# Patient Record
Sex: FEMALE | Race: ASIAN | NOT HISPANIC OR LATINO | Employment: FULL TIME | URBAN - METROPOLITAN AREA
[De-identification: names, ages, dates, MRNs, and addresses within clinical notes are randomized per-mention and may not be internally consistent; named-entity substitution may affect disease eponyms.]

---

## 2021-11-12 NOTE — TELEPHONE ENCOUNTER
RECORDS RECEIVED FROM: Self / CE    Appt Date: 11.17.2021   NOTES STATUS DETAILS   OFFICE NOTE from referring provider N/A    OFFICE NOTES from other specialists [facility] Everywhere 12.07.2020 Jenae Dewitt M.D.    DISCHARGE SUMMARY from hospital N/A    MEDICATION LIST Care Everywhere    LIVER BIOSPY (IF APPLICABLE)      PATHOLOGY REPORTS  N/A    IMAGING     ENDOSCOPY (IF AVAILABLE) N/A    COLONOSCOPY (IF AVAILABLE) N/A    ULTRASOUND LIVER N/A    CT OF ABDOMEN Care Everywhere - in process  02.11.2020, 02.15.2020 ABDOMEN AND PELVIS WITH CONTRAST      MRI OF LIVER Care Everywhere - in process 01.30.2020 MRI MRCP W/O & W CONTRAST     FIBROSCAN, US ELASTOGRAPHY, FIBROSIS SCAN, MR ELASTOGRAPHY N/A    LABS     HEPATIC PANEL (LIVER PANEL) Care Everywhere 10.27.2021   BASIC METABOLIC PANEL Care Everywhere 12.07.2020   COMPLETE METABOLIC PANEL Care Everywhere 10.27.2021   COMPLETE BLOOD COUNT (CBC) Care Everywhere 10.27.2021   INTERNATIONAL NORMALIZED RATIO (INR) N/A    HEPATITIS C ANTIBODY N/A    HEPATITIS C VIRAL LOAD/PCR N/A    HEPATITIS C GENOTYPE N/A    HEPATITIS B SURFACE ANTIGEN N/A    HEPATITIS B SURFACE ANTIBODY N/A    HEPATITIS B DNA QUANT LEVEL N/A    HEPATITIS B CORE ANTIBODY N/A      Action 11.12.2021 RM 2:42PM   Action Taken Called [facility] at [phone] was told to fax request to [phone], pending     Action 11.17.2021 RM 6:51AM   Action Taken Images received from [facility] took to 4n to get them uploaded to the chart.     Action 11.17.2021 rm 12:08pm   Action Taken Images received and uploaded to chart

## 2021-11-17 ENCOUNTER — PRE VISIT (OUTPATIENT)
Dept: GASTROENTEROLOGY | Facility: CLINIC | Age: 31
End: 2021-11-17
Payer: COMMERCIAL

## 2021-11-17 ENCOUNTER — VIRTUAL VISIT (OUTPATIENT)
Dept: GASTROENTEROLOGY | Facility: CLINIC | Age: 31
End: 2021-11-17
Attending: INTERNAL MEDICINE
Payer: COMMERCIAL

## 2021-11-17 DIAGNOSIS — K83.9 BILIARY TRACT DISEASE: Primary | ICD-10-CM

## 2021-11-17 PROCEDURE — 99205 OFFICE O/P NEW HI 60 MIN: CPT | Mod: 95 | Performed by: INTERNAL MEDICINE

## 2021-11-17 RX ORDER — URSODIOL 250 MG/1
1 TABLET, FILM COATED ORAL
COMMUNITY
Start: 2021-08-27 | End: 2022-08-29

## 2021-11-17 RX ORDER — PROGESTERONE 200 MG/1
200 CAPSULE ORAL 2 TIMES DAILY
COMMUNITY
End: 2022-04-25

## 2021-11-17 RX ORDER — PRENATAL VIT/IRON FUM/FOLIC AC 27MG-0.8MG
1 TABLET ORAL DAILY
COMMUNITY

## 2021-11-17 ASSESSMENT — PAIN SCALES - GENERAL: PAINLEVEL: NO PAIN (0)

## 2021-11-17 NOTE — PROGRESS NOTES
Reyna is a 31 year old who is being evaluated via a billable video visit.      How would you like to obtain your AVS? MyChart  If the video visit is dropped, the invitation should be resent by: Send to e-mail at: [email or web address]  Will anyone else be joining your video visit? No      Video Start Time: 0807  Video-Visit Details    Type of service:  Video Visit    Video End Time:0837    Originating Location (pt. Location): Home    Distant Location (provider location):  [facility] [place]     Platform used for Video Visit: [employer]   _____________________________________________________________________________    [facility]    Hepatology New Patient Visit    Referring provider:  Referred Self      31 year old female    Chief complaint:  Hepatico-choledocholithiasis     HPI:  Hx choledochal cyst  - resection with hepaticojejunostomy ([place]) 2008  - laparoscopic cyst resection ([facility]) 2013  - recurrent cholangitis due to hepatolithiasis Feb 2020  - ERCP 2/11/2020  - PTC 2/13/2020  - liver bx 2/28/2020  - IR choledochoplasty 2/28/2020  - IR choledochoplasty 3/6/2020  - IR choledochoplasty 4/24/2020  - ERCP with [facility] 5/7/2020  - ERCP with [facility] 5/27/2020    The patient presents to establish care for recurrent hepatolithiasis and pregnancy.  The patient was diagnosed with a choledochal cyst in 2008 following 1 week of abdominal pain (at summer [place]) followed by nausea and vomiting.  She underwent a cyst resection with Courtney-en-Y hepaticojejunostomy in 2008.  In 2013, following an additional imaging for abnormal liver function tests, the patient underwent resection of a second cyst.      In 02/2020, the patient developed issues with recurrent cholangitis secondary to hepatolithiasis further complicated with left portal vein thrombosis.  She underwent IR-guided choledochoplasty throughout 02/2020, 03/2020 and 04/2020 in addition to undergoing endoscopic hydraulic  lithotripsy through a cholangioscope advanced through a percutaneous biliary drain tract in 05/2020.  The patient last had an MRCP/MRI in 12/2020 which showed no residual stones.    The patient is well today.  She has some mild nausea related to pregnancy.  The patient is 10 weeks pregnant and so far, things have been going well.  Her due date is 6/14/2022 based on ultrasound dates.    The patient denies abdominal pain, jaundice, abdominal distention, lower extremity edema, lethargy or confusion.    No history of melena, hematemesis or hematochezia.    The patient denies fevers, sweats or chills.  She is vaccinated against COVID-19.    Weight is stable.  Appetite is good.    The patient is not currently drinking alcohol.  Prior to getting pregnant, she would drink alcohol once to twice per week and drink 1-2 glasses of wine on these occasions.  She has no history of alcohol abuse or DUIs.    The patient has never smoked.  She denies any history of recreational drug use including marijuana, IM or IV drugs.      Medical hx Surgical hx   Past Medical History:   Diagnosis Date     Ascending cholangitis 02/10/2020     Choledochal cyst 2008     Choledochal cyst 2013     Portal vein thrombosis 02/15/2020      Past Surgical History:   Procedure Laterality Date     CHOLEDOCHAL CYST EXCISION  2008    [place]     CHOLEDOCHAL CYST EXCISION  2013    [facility]     ENDOSCOPIC RETROGRADE CHOLANGIOPANCREATOGRAPHY  05/27/2020    [facility]     ENDOSCOPIC RETROGRADE CHOLANGIOPANCREATOGRAPHY  05/07/2020    [facility]     HEPATICOJEJUNOSTOMY  2008     PERCUTANEOUS LIVER BIOPSY  02/28/2020     PERCUTANEOUS TRANSHEPATIC CHOLANGIOGRAHPY AND BILLIARY DRAINAGE  02/13/2020          Medications  Current Outpatient Medications   Medication Sig Dispense Refill     enoxaparin ANTICOAGULANT (LOVENOX) 40 MG/0.4ML syringe Inject Subcutaneous daily       Prenatal Vit-Fe Fumarate-FA (PRENATAL MULTIVITAMIN W/IRON) 27-0.8 MG tablet Take 1 tablet by mouth daily        progesterone (PROMETRIUM) 200 MG capsule Take 200 mg by mouth 2 times daily       ursodiol (ACTIGALL) 250 MG tablet Take 1 tablet by mouth 3 times daily         Allergies  No Known Allergies    Family hx Social hx   Family History   Problem Relation Age of Onset     Hepatitis Father         B     Gestational Diabetes Sister      Diabetes Maternal Grandmother      Cerebrovascular Disease Maternal Grandfather      Colon Cancer No family hx of       Social History     Tobacco Use     Smoking status: Never Smoker     Smokeless tobacco: Never Used   Substance Use Topics     Alcohol use: Not Currently     Drug use: Not Currently     Lives in [facility] [place] [facility] with .  Moved from [place].  Grew up in [place].  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  1 sister who is healthy.     Review of systems  A 10-point review of systems was negative.    Examination  Gen- well, NAD, A+Ox3, normal color  Eye- EOMI, no scleral icterus  Skin- no rash or jaundice  Psych- normal mood    Laboratory  10/27/2021  WCC 3.9, hgb 11.6, plt 295    TB 0.9, ALT 19, AST 24, AlkPh 49    HIV Ab neg  HCV Ab neg  HBV SAg neg    Liver bx 2020- extrahepatic biliary obstruction, normal liver parenchyma, no hepatic fibrosis    Resection specimen 2013- choledochal cyst    Radiology  MRI liver/MRCP Dec 2020 reviewed- no stones    Assessment  31-year-old  female who presents to establish care for a history of choledochal cysts and hepatolithiasis.  Liver function tests normal on low-dose ursodiol.  Will monitor liver function tests monthly throughout the patient's pregnancy, as hepatoliths may precipitate in the setting of current pregnancy-related hormonal changes.  Would not pursue routine MRI imaging while pregnant unless the patient develops abnormal liver function tests.      While the patient will need long-term surveillance with blood work, it is not clear if routine surveillance with MRI will be needed.   Furthermore, will eventually need to decide if patient needs to continue on long-term anticoagulation for portal vein thrombosis.      Plan  1.  Check LFTs monthly during pregnancy  2.  Continue ursodiol  3.  Follow-up in 6 months (in person)    Nehemiah Parks MD  Hepatology  [facility]    I spent 60 minutes on the date of the encounter doing chart review, history and exam, documentation and further activities as noted above.

## 2021-11-17 NOTE — LETTER
11/17/2021         RE: Reyna Liriano  [address]  [place] [address]        Dear Colleague,    Thank you for referring your patient, Reyna Liriano, to the [facility]. Please see a copy of my visit note below.    Reyna is a 31 year old who is being evaluated via a billable video visit.      How would you like to obtain your AVS? MyChart  If the video visit is dropped, the invitation should be resent by: Send to e-mail at: [email or web address]  Will anyone else be joining your video visit? No      Video Start Time: 0807  Video-Visit Details    Type of service:  Video Visit    Video End Time:0837    Originating Location (pt. Location): Home    Distant Location (provider location):  [facility]     Platform used for Video Visit: [employer]   _____________________________________________________________________________    [facility]    Hepatology New Patient Visit    Referring provider:  Referred Self      31 year old female    Chief complaint:  Hepatico-choledocholithiasis     HPI:  Hx choledochal cyst  - resection with hepaticojejunostomy ([place]) 2008  - laparoscopic cyst resection ([facility]) 2013  - recurrent cholangitis due to hepatolithiasis Feb 2020  - ERCP 2/11/2020  - PTC 2/13/2020  - liver bx 2/28/2020  - IR choledochoplasty 2/28/2020  - IR choledochoplasty 3/6/2020  - IR choledochoplasty 4/24/2020  - ERCP with [facility] 5/7/2020  - ERCP with [facility] 5/27/2020    The patient presents to establish care for recurrent hepatolithiasis and pregnancy.  The patient was diagnosed with a choledochal cyst in 2008 following 1 week of abdominal pain ([place] followed by nausea and vomiting.  She underwent a cyst resection with Courtney-en-Y hepaticojejunostomy in 2008.  In 2013, following an additional imaging for abnormal liver function tests, the patient underwent resection of a second cyst.      In 02/2020,  the patient developed issues with recurrent cholangitis secondary to hepatolithiasis further complicated with left portal vein thrombosis.  She underwent IR-guided choledochoplasty throughout 02/2020, 03/2020 and 04/2020 in addition to undergoing endoscopic hydraulic lithotripsy through a cholangioscope advanced through a percutaneous biliary drain tract in 05/2020.  The patient last had an MRCP/MRI in 12/2020 which showed no residual stones.    The patient is well today.  She has some mild nausea related to pregnancy.  The patient is 10 weeks pregnant and so far, things have been going well.  Her due date is 6/14/2022 based on ultrasound dates.    The patient denies abdominal pain, jaundice, abdominal distention, lower extremity edema, lethargy or confusion.    No history of melena, hematemesis or hematochezia.    The patient denies fevers, sweats or chills.  She is vaccinated against COVID-19.    Weight is stable.  Appetite is good.    The patient is not currently drinking alcohol.  Prior to getting pregnant, she would drink alcohol once to twice per week and drink 1-2 glasses of wine on these occasions.  She has no history of alcohol abuse or DUIs.    The patient has never smoked.  She denies any history of recreational drug use including marijuana, IM or IV drugs.      Medical hx Surgical hx   Past Medical History:   Diagnosis Date     Ascending cholangitis 02/10/2020     Choledochal cyst 2008     Choledochal cyst 2013     Portal vein thrombosis 02/15/2020      Past Surgical History:   Procedure Laterality Date     CHOLEDOCHAL CYST EXCISION  2008    [place]     CHOLEDOCHAL CYST EXCISION  2013    [facility]     ENDOSCOPIC RETROGRADE CHOLANGIOPANCREATOGRAPHY  05/27/2020    [facility]     ENDOSCOPIC RETROGRADE CHOLANGIOPANCREATOGRAPHY  05/07/2020    [facility]     HEPATICOJEJUNOSTOMY  2008     PERCUTANEOUS LIVER BIOPSY  02/28/2020     PERCUTANEOUS TRANSHEPATIC CHOLANGIOGRAHPY AND BILLIARY DRAINAGE  02/13/2020           Medications  Current Outpatient Medications   Medication Sig Dispense Refill     enoxaparin ANTICOAGULANT (LOVENOX) 40 MG/0.4ML syringe Inject Subcutaneous daily       Prenatal Vit-Fe Fumarate-FA (PRENATAL MULTIVITAMIN W/IRON) 27-0.8 MG tablet Take 1 tablet by mouth daily       progesterone (PROMETRIUM) 200 MG capsule Take 200 mg by mouth 2 times daily       ursodiol (ACTIGALL) 250 MG tablet Take 1 tablet by mouth 3 times daily         Allergies  No Known Allergies    Family hx Social hx   Family History   Problem Relation Age of Onset     Hepatitis Father         B     Gestational Diabetes Sister      Diabetes Maternal Grandmother      Cerebrovascular Disease Maternal Grandfather      Colon Cancer No family hx of       Social History     Tobacco Use     Smoking status: Never Smoker     Smokeless tobacco: Never Used   Substance Use Topics     Alcohol use: Not Currently     Drug use: Not Currently     Lives in [facility] [place] with .  Moved from [place].  Grew up in [place].  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  1 sister who is healthy.     Review of systems  A 10-point review of systems was negative.    Examination  Gen- well, NAD, A+Ox3, normal color  Eye- EOMI, no scleral icterus  Skin- no rash or jaundice  Psych- normal mood    Laboratory  10/27/2021  WCC 3.9, hgb 11.6, plt 295    TB 0.9, ALT 19, AST 24, AlkPh 49    HIV Ab neg  HCV Ab neg  HBV SAg neg    Liver bx 2020- extrahepatic biliary obstruction, normal liver parenchyma, no hepatic fibrosis    Resection specimen 2013- choledochal cyst    Radiology  MRI liver/MRCP Dec 2020 reviewed- no stones    Assessment  31-year-old  female who presents to establish care for a history of choledochal cysts and hepatolithiasis.  Liver function tests normal on low-dose ursodiol.  Will monitor liver function tests monthly throughout the patient's pregnancy, as hepatoliths may precipitate in the setting of current  pregnancy-related hormonal changes.  Would not pursue routine MRI imaging while pregnant unless the patient develops abnormal liver function tests.      While the patient will need long-term surveillance with blood work, it is not clear if routine surveillance with MRI will be needed.  Furthermore, will eventually need to decide if patient needs to continue on long-term anticoagulation for portal vein thrombosis.      Plan  1.  Check LFTs monthly during pregnancy  2.  Continue ursodiol  3.  Follow-up in 6 months (in person)    Nehemiah Parks MD  Hepatology  [facility]    I spent 60 minutes on the date of the encounter doing chart review, history and exam, documentation and further activities as noted above.            Again, thank you for allowing me to participate in the care of your patient.        Sincerely,        Nehemiah Parks MD

## 2021-11-17 NOTE — LETTER
Date:December 8, 2021      Patient was self referred, no letter generated. Do not send.        [facility] Health Information

## 2021-12-12 ENCOUNTER — HEALTH MAINTENANCE LETTER (OUTPATIENT)
Age: 31
End: 2021-12-12

## 2022-01-21 ENCOUNTER — LAB (OUTPATIENT)
Dept: LAB | Facility: CLINIC | Age: 32
End: 2022-01-21
Attending: INTERNAL MEDICINE
Payer: COMMERCIAL

## 2022-01-21 DIAGNOSIS — K83.9 BILIARY TRACT DISEASE: ICD-10-CM

## 2022-01-21 LAB
ALBUMIN SERPL-MCNC: 3.4 G/DL (ref 3.4–5)
ALP SERPL-CCNC: 39 U/L (ref 40–150)
ALT SERPL W P-5'-P-CCNC: 17 U/L (ref 0–50)
AST SERPL W P-5'-P-CCNC: 10 U/L (ref 0–45)
BILIRUB DIRECT SERPL-MCNC: <0.1 MG/DL (ref 0–0.2)
BILIRUB SERPL-MCNC: 0.3 MG/DL (ref 0.2–1.3)
PROT SERPL-MCNC: 7.6 G/DL (ref 6.8–8.8)

## 2022-01-21 PROCEDURE — 80076 HEPATIC FUNCTION PANEL: CPT

## 2022-01-21 PROCEDURE — 36415 COLL VENOUS BLD VENIPUNCTURE: CPT

## 2022-02-14 ENCOUNTER — LAB (OUTPATIENT)
Dept: LAB | Facility: CLINIC | Age: 32
End: 2022-02-14
Attending: INTERNAL MEDICINE
Payer: COMMERCIAL

## 2022-02-14 DIAGNOSIS — K83.9 BILIARY TRACT DISEASE: ICD-10-CM

## 2022-02-14 LAB
ALBUMIN SERPL-MCNC: 3.2 G/DL (ref 3.4–5)
ALP SERPL-CCNC: 35 U/L (ref 40–150)
ALT SERPL W P-5'-P-CCNC: 13 U/L (ref 0–50)
AST SERPL W P-5'-P-CCNC: 10 U/L (ref 0–45)
BILIRUB DIRECT SERPL-MCNC: <0.1 MG/DL (ref 0–0.2)
BILIRUB SERPL-MCNC: 0.2 MG/DL (ref 0.2–1.3)
PROT SERPL-MCNC: 7.3 G/DL (ref 6.8–8.8)

## 2022-02-14 PROCEDURE — 80076 HEPATIC FUNCTION PANEL: CPT

## 2022-02-14 PROCEDURE — 36415 COLL VENOUS BLD VENIPUNCTURE: CPT

## 2022-03-16 ENCOUNTER — LAB (OUTPATIENT)
Dept: LAB | Facility: CLINIC | Age: 32
End: 2022-03-16
Payer: COMMERCIAL

## 2022-03-16 DIAGNOSIS — K83.9 BILIARY TRACT DISEASE: ICD-10-CM

## 2022-03-16 DIAGNOSIS — K83.9 BILIARY TRACT DISEASE: Primary | ICD-10-CM

## 2022-03-16 LAB
ALBUMIN SERPL-MCNC: 3.4 G/DL (ref 3.4–5)
ALP SERPL-CCNC: 46 U/L (ref 40–150)
ALT SERPL W P-5'-P-CCNC: 14 U/L (ref 0–50)
AST SERPL W P-5'-P-CCNC: 11 U/L (ref 0–45)
BILIRUB DIRECT SERPL-MCNC: 0.1 MG/DL (ref 0–0.2)
BILIRUB SERPL-MCNC: 0.3 MG/DL (ref 0.2–1.3)
PROT SERPL-MCNC: 7.6 G/DL (ref 6.8–8.8)

## 2022-03-16 PROCEDURE — 36415 COLL VENOUS BLD VENIPUNCTURE: CPT

## 2022-03-16 PROCEDURE — 82040 ASSAY OF SERUM ALBUMIN: CPT

## 2022-04-18 ENCOUNTER — LAB (OUTPATIENT)
Dept: LAB | Facility: CLINIC | Age: 32
End: 2022-04-18
Payer: COMMERCIAL

## 2022-04-18 DIAGNOSIS — K83.9 BILIARY TRACT DISEASE: ICD-10-CM

## 2022-04-18 LAB
ALBUMIN SERPL-MCNC: 3.2 G/DL (ref 3.4–5)
ALP SERPL-CCNC: 71 U/L (ref 40–150)
ALT SERPL W P-5'-P-CCNC: 13 U/L (ref 0–50)
ANION GAP SERPL CALCULATED.3IONS-SCNC: 8 MMOL/L (ref 3–14)
AST SERPL W P-5'-P-CCNC: 8 U/L (ref 0–45)
BILIRUB DIRECT SERPL-MCNC: <0.1 MG/DL (ref 0–0.2)
BILIRUB SERPL-MCNC: 0.3 MG/DL (ref 0.2–1.3)
BUN SERPL-MCNC: 11 MG/DL (ref 7–30)
CALCIUM SERPL-MCNC: 9 MG/DL (ref 8.5–10.1)
CHLORIDE BLD-SCNC: 107 MMOL/L (ref 94–109)
CO2 SERPL-SCNC: 21 MMOL/L (ref 20–32)
CREAT SERPL-MCNC: 0.55 MG/DL (ref 0.52–1.04)
ERYTHROCYTE [DISTWIDTH] IN BLOOD BY AUTOMATED COUNT: 12.7 % (ref 10–15)
GFR SERPL CREATININE-BSD FRML MDRD: >90 ML/MIN/1.73M2
GLUCOSE BLD-MCNC: 98 MG/DL (ref 70–99)
HCT VFR BLD AUTO: 32.7 % (ref 35–47)
HGB BLD-MCNC: 11 G/DL (ref 11.7–15.7)
INR PPP: 0.93 (ref 0.85–1.15)
MCH RBC QN AUTO: 31.3 PG (ref 26.5–33)
MCHC RBC AUTO-ENTMCNC: 33.6 G/DL (ref 31.5–36.5)
MCV RBC AUTO: 93 FL (ref 78–100)
PLATELET # BLD AUTO: 241 10E3/UL (ref 150–450)
POTASSIUM BLD-SCNC: 4.1 MMOL/L (ref 3.4–5.3)
PROT SERPL-MCNC: 7.2 G/DL (ref 6.8–8.8)
RBC # BLD AUTO: 3.52 10E6/UL (ref 3.8–5.2)
SODIUM SERPL-SCNC: 136 MMOL/L (ref 133–144)
WBC # BLD AUTO: 7.9 10E3/UL (ref 4–11)

## 2022-04-18 PROCEDURE — 80053 COMPREHEN METABOLIC PANEL: CPT

## 2022-04-18 PROCEDURE — 85610 PROTHROMBIN TIME: CPT

## 2022-04-18 PROCEDURE — 36415 COLL VENOUS BLD VENIPUNCTURE: CPT

## 2022-04-18 PROCEDURE — 85014 HEMATOCRIT: CPT

## 2022-04-18 PROCEDURE — 82248 BILIRUBIN DIRECT: CPT

## 2022-04-25 ENCOUNTER — OFFICE VISIT (OUTPATIENT)
Dept: GASTROENTEROLOGY | Facility: CLINIC | Age: 32
End: 2022-04-25
Attending: INTERNAL MEDICINE
Payer: COMMERCIAL

## 2022-04-25 VITALS
DIASTOLIC BLOOD PRESSURE: 81 MMHG | WEIGHT: 157.3 LBS | HEART RATE: 72 BPM | OXYGEN SATURATION: 97 % | SYSTOLIC BLOOD PRESSURE: 111 MMHG

## 2022-04-25 DIAGNOSIS — I81 PORTAL VEIN THROMBOSIS: ICD-10-CM

## 2022-04-25 DIAGNOSIS — Z3A.33 33 WEEKS GESTATION OF PREGNANCY: ICD-10-CM

## 2022-04-25 DIAGNOSIS — K83.9 BILIARY TRACT DISEASE: Primary | ICD-10-CM

## 2022-04-25 PROCEDURE — G0463 HOSPITAL OUTPT CLINIC VISIT: HCPCS

## 2022-04-25 PROCEDURE — 99215 OFFICE O/P EST HI 40 MIN: CPT | Performed by: INTERNAL MEDICINE

## 2022-04-25 ASSESSMENT — PAIN SCALES - GENERAL: PAINLEVEL: NO PAIN (0)

## 2022-04-25 NOTE — PROGRESS NOTES
[facility] Hepatology    Follow-up Visit    Follow-up visit for hepatico-choledocholithiasis     Subjective:  31 year old female    Hx choledochal cyst  - resection with hepaticojejunostomy ([place]) 2008  - laparoscopic cyst resection ([facility]) 2013  - recurrent cholangitis due to hepatolithiasis Feb 2020  - ERCP 2/11/2020  - PTC 2/13/2020  - liver bx 2/28/2020- extrahepatic biliary obstruction, normal liver parenchyma, no hepatic fibrosis  - IR choledochoplasty 2/28/2020  - IR choledochoplasty 3/6/2020  - IR choledochoplasty 4/24/2020  - ERCP with [facility] 5/7/2020  - ERCP with [facility] 5/27/2020    Last visit Nov 2021.  No new medications, ER visits or hospital admissions since last visit.  Liver function tests have been checked monthly and returned normal.    Patient is well today.  She denies any symptoms related to liver disease. Her pregnancy has been uncomplicated to date.  She will be having a baby boy and her due date is Jun 14th.  She will be induced at 39 weeks as she is on enoxaparin.    Patient continues to take enoxaparin at the recommendation of vascular medicine in [place].  She saw vascular medicine (Dr Abdi, see CareEverywhere) at [facility] in 6/2020 who recommended prophylactic anticoagulation whenever she became pregnant in the context of provoked PVT.  Dr Abid did not recommend long-term anticoagulation.      Patient denies abdominal pain, nausea (out of proportion to pregnancy), jaundice, lower extremity edema, abdominal distension, lethargy or confusion.    Patient denies melena, hematemesis or hematochezia.    Patient denies fevers, sweats or chills.  Patient is vaccinated and boosted against COVID-19.    Weight is appropriate to pregnancy.  Appetite is normal.      Patient does not drink alcohol.  She has 26 weeks off for maternity leave ([employer]).      Medical hx Surgical hx   Past Medical History:   Diagnosis Date     Ascending cholangitis 02/10/2020     Choledochal cyst  2008     Choledochal cyst 2013     Portal vein thrombosis 02/15/2020      Past Surgical History:   Procedure Laterality Date     CHOLEDOCHAL CYST EXCISION  2008    [place]     CHOLEDOCHAL CYST EXCISION  2013    [facility]     ENDOSCOPIC RETROGRADE CHOLANGIOPANCREATOGRAPHY  05/27/2020    [facility]     ENDOSCOPIC RETROGRADE CHOLANGIOPANCREATOGRAPHY  05/07/2020    [facility]     HEPATICOJEJUNOSTOMY  2008     PERCUTANEOUS LIVER BIOPSY  02/28/2020     PERCUTANEOUS TRANSHEPATIC CHOLANGIOGRAHPY AND BILLIARY DRAINAGE  02/13/2020          Medications  Prior to Admission medications    Medication Sig Start Date End Date Taking? Authorizing Provider   enoxaparin ANTICOAGULANT (LOVENOX) 40 MG/0.4ML syringe Inject Subcutaneous daily 10/9/21  Yes Reported, Patient   Prenatal Vit-Fe Fumarate-FA (PRENATAL MULTIVITAMIN W/IRON) 27-0.8 MG tablet Take 1 tablet by mouth daily   Yes Reported, Patient   ursodiol (ACTIGALL) 250 MG tablet Take 1 tablet by mouth 3 times daily 8/27/21 5/24/22 Yes Reported, Patient       Allergies  No Known Allergies    Review of systems  A 10-point review of systems was negative    Examination  /81   Pulse 72   Wt 71.4 kg (157 lb 4.8 oz)   SpO2 97%   Breastfeeding No   There is no height or weight on file to calculate BMI.    Gen- well, NAD, A+Ox3, normal color  CVS- RRR  RS- CTA  Abd- scars consistent with prior surgery, gravid uterus, SNT, no ascites or organomegaly on palpation or percussion, BS+  Extr- hands normal, no NATALIA  Skin- no rash or jaundice  Neuro- no asterixis  Psych- normal mood    Laboratory  Lab Results   Component Value Date     04/18/2022    POTASSIUM 4.1 04/18/2022    CHLORIDE 107 04/18/2022    CO2 21 04/18/2022    BUN 11 04/18/2022    CR 0.55 04/18/2022       Lab Results   Component Value Date    BILITOTAL 0.3 04/18/2022    ALT 13 04/18/2022    AST 8 04/18/2022    ALKPHOS 71 04/18/2022       Lab Results   Component Value Date    ALBUMIN 3.2 04/18/2022    PROTTOTAL 7.2 04/18/2022        Lab  Results   Component Value Date    WBC 7.9 2022    HGB 11.0 2022    MCV 93 2022     2022       Lab Results   Component Value Date    INR 0.93 2022       Radiology  Nil recent    Assessment  31 year old pregnant female who presents for follow-up of hepatico-choledocholithiasis.  Liver function tests remain normal throughout pregnancy to date and no symptoms of hepaticolithiasis so far.  Will continue ursodiol and with monthly liver function tests during pregnancy and early post-mireya period.  Will consult hematology to clarify if the patient will need anticoagulation beyond the peripartum period given her complex past history.    Plan  1.  Continue ursodiol  2.  Check LFTs monthly until next visit  3.  Heme consult re long-term anticoagulation beyond pregnancy for provoked PVT  4.  Follow-up in 6 months    Nehemiah Parks MD  Hepatology  [facility]    I spent 40 minutes on the date of the encounter doing chart review, history and exam, documentation and further activities as noted above.

## 2022-04-25 NOTE — LETTER
4/25/2022     RE: Reyna Liriano  [address]  [facility] [address]    Dear Colleague,    Thank you for referring your patient, Reyna Liriano, to the [facility]PAT[facility] [place]. Please see a copy of my visit note below.    [facility] Hepatology    Follow-up Visit    Follow-up visit for hepatico-choledocholithiasis     Subjective:  31 year old female    Hx choledochal cyst  - resection with hepaticojejunostomy ([place]) 2008  - laparoscopic cyst resection ([facility]) 2013  - recurrent cholangitis due to hepatolithiasis Feb 2020  - ERCP 2/11/2020  - PTC 2/13/2020  - liver bx 2/28/2020- extrahepatic biliary obstruction, normal liver parenchyma, no hepatic fibrosis  - IR choledochoplasty 2/28/2020  - IR choledochoplasty 3/6/2020  - IR choledochoplasty 4/24/2020  - ERCP with [facility] 5/7/2020  - ERCP with [facility] 5/27/2020    Last visit Nov 2021.  No new medications, ER visits or hospital admissions since last visit.  Liver function tests have been checked monthly and returned normal.    Patient is well today.  She denies any symptoms related to liver disease. Her pregnancy has been uncomplicated to date.  She will be having a baby boy and her due date is Jun 14th.  She will be induced at 39 weeks as she is on enoxaparin.    Patient continues to take enoxaparin at the recommendation of vascular medicine in [place].  She saw vascular medicine (Dr Abdi, see CareEverywhere) at [facility] in 6/2020 who recommended prophylactic anticoagulation whenever she became pregnant in the context of provoked PVT.  Dr Abdi did not recommend long-term anticoagulation.      Patient denies abdominal pain, nausea (out of proportion to pregnancy), jaundice, lower extremity edema, abdominal distension, lethargy or confusion.    Patient denies melena, hematemesis or hematochezia.    Patient denies fevers, sweats or chills.  Patient is vaccinated and boosted against COVID-19.    Weight is appropriate to  pregnancy.  Appetite is normal.      Patient does not drink alcohol.  She has 26 weeks off for maternity leave ([employer]).      Medical hx Surgical hx   Past Medical History:   Diagnosis Date     Ascending cholangitis 02/10/2020     Choledochal cyst 2008     Choledochal cyst 2013     Portal vein thrombosis 02/15/2020      Past Surgical History:   Procedure Laterality Date     CHOLEDOCHAL CYST EXCISION  2008    [place]     CHOLEDOCHAL CYST EXCISION  2013    [facility]     ENDOSCOPIC RETROGRADE CHOLANGIOPANCREATOGRAPHY  05/27/2020    [facility]     ENDOSCOPIC RETROGRADE CHOLANGIOPANCREATOGRAPHY  05/07/2020    [facility]     HEPATICOJEJUNOSTOMY  2008     PERCUTANEOUS LIVER BIOPSY  02/28/2020     PERCUTANEOUS TRANSHEPATIC CHOLANGIOGRAHPY AND BILLIARY DRAINAGE  02/13/2020          Medications  Prior to Admission medications    Medication Sig Start Date End Date Taking? Authorizing Provider   enoxaparin ANTICOAGULANT (LOVENOX) 40 MG/0.4ML syringe Inject Subcutaneous daily 10/9/21  Yes Reported, Patient   Prenatal Vit-Fe Fumarate-FA (PRENATAL MULTIVITAMIN W/IRON) 27-0.8 MG tablet Take 1 tablet by mouth daily   Yes Reported, Patient   ursodiol (ACTIGALL) 250 MG tablet Take 1 tablet by mouth 3 times daily 8/27/21 5/24/22 Yes Reported, Patient       Allergies  No Known Allergies    Review of systems  A 10-point review of systems was negative    Examination  /81   Pulse 72   Wt 71.4 kg (157 lb 4.8 oz)   SpO2 97%   Breastfeeding No   There is no height or weight on file to calculate BMI.    Gen- well, NAD, A+Ox3, normal color  CVS- RRR  RS- CTA  Abd- scars consistent with prior surgery, gravid uterus, SNT, no ascites or organomegaly on palpation or percussion, BS+  Extr- hands normal, no NATALIA  Skin- no rash or jaundice  Neuro- no asterixis  Psych- normal mood    Laboratory  Lab Results   Component Value Date     04/18/2022    POTASSIUM 4.1 04/18/2022    CHLORIDE 107 04/18/2022    CO2 21 04/18/2022    BUN 11 04/18/2022    CR  0.55 2022       Lab Results   Component Value Date    BILITOTAL 0.3 2022    ALT 13 2022    AST 8 2022    ALKPHOS 71 2022       Lab Results   Component Value Date    ALBUMIN 3.2 2022    PROTTOTAL 7.2 2022        Lab Results   Component Value Date    WBC 7.9 2022    HGB 11.0 2022    MCV 93 2022     2022       Lab Results   Component Value Date    INR 0.93 2022       Radiology  Nil recent    Assessment  31 year old pregnant female who presents for follow-up of hepatico-choledocholithiasis.  Liver function tests remain normal throughout pregnancy to date and no symptoms of hepaticolithiasis so far.  Will continue ursodiol and with monthly liver function tests during pregnancy and early post- period.  Will consult hematology to clarify if the patient will need anticoagulation beyond the peripartum period given her complex past history.    Plan  1.  Continue ursodiol  2.  Check LFTs monthly until next visit  3.  Heme consult re long-term anticoagulation beyond pregnancy for provoked PVT  4.  Follow-up in 6 months    Nehemiah Parks MD  Hepatology  [facility]    I spent 40 minutes on the date of the encounter doing chart review, history and exam, documentation and further activities as noted above.

## 2022-04-25 NOTE — NURSING NOTE
Chief Complaint   Patient presents with     RECHECK     Follow up     Blood pressure 111/81, pulse 72, weight 71.4 kg (157 lb 4.8 oz), SpO2 97 %, not currently breastfeeding.    Tiffanie Marrufo MA

## 2022-05-12 ENCOUNTER — VIRTUAL VISIT (OUTPATIENT)
Dept: HEMATOLOGY | Facility: CLINIC | Age: 32
End: 2022-05-12
Attending: INTERNAL MEDICINE
Payer: COMMERCIAL

## 2022-05-12 DIAGNOSIS — I81 PORTAL VEIN THROMBOSIS: ICD-10-CM

## 2022-05-12 DIAGNOSIS — Z3A.33 33 WEEKS GESTATION OF PREGNANCY: ICD-10-CM

## 2022-05-12 PROCEDURE — 99204 OFFICE O/P NEW MOD 45 MIN: CPT | Performed by: INTERNAL MEDICINE

## 2022-05-12 NOTE — PROGRESS NOTES
[facility]  [address] [place] [id number]  Phone: [phone], Fax: [phone]    Outpatient Visit Note:    Patient: Reyna Liriano  MRN: [id number]  : 1990  JACOB: May 12, 2022     Reason for Consultation:  Reyna Liriano is a referred by Dr Parks for evaluation and treatment of portal vein thrombosis during pregnancy.    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  She is currently pregnant and doing well on prophylactic dose enoxaparin which I think is appropriate and should continue until you have the first sign of labor or 2 days prior to planned induction of labor.  She should then continue the same dose of enoxaparin for 6 weeks postpartum.    Recommendations:  1. Continue enoxaparin 40 mg subcutaneous until either 2 days prior to planned induction of labor or for signs of labor if this occurs first.  2. Restart enoxaparin 40 mg subcutaneously for 6 weeks postpartum and then she can stop all anticoagulation  3. She should receive 30 days of prophylactic enoxaparin after hospitalization for recurrent cholangitis which she reports occurs every 3 to 4 years, hospitalization for any cause, or any surgical procedure given her history of provoked VTE    50 minutes spent on the date of the encounter doing chart review, review of outside records, review of test results, interpretation of tests, patient visit and documentation     Abisai Traylor MD   of Medicine, Division of Hematology, Oncology and Transplantation  [employer]     -------------------------------  History: Reyna Liriano is a 31 year old woman with a history of recurrent biliary tract disease with recurrent cholangitis and has had a history of portal vein thrombosis and is currently pregnant.  She recently relocated from [place] where she has  been managed for many years for this disease and established with Dr. Parks of hepatology here at the [facility].  She was first diagnosed with this disorder back in  feet of a choledochal cyst that required surgical resection.  Then in  she had recurrent cholangitis that is quite complex including development of portal vein thrombosis.  She was told to start anticoagulation when pregnant given this provoked VTE.  She is currently feeling well today.  She has had no bleeding up until today while on enoxaparin but unfortunately has had vaginal bleeding due to placenta previa which is currently being evaluated by her obstetrics team.    Medications are notable for enoxaparin 40 daily.  She is a non-smoker.  She has no family history of VTE.    Physical Exam:  Exam:  There is no height or weight on file to calculate BMI.   Constitutional: Appears well, no distress  Eyes: no discharge, injection or icterus  Respiratory: no cough or labored breathing  Skin: no rashes or petechiae  Neurological: no deficits appreciated, speech is fluent  Psych: affect is normal    Labs:  Creatinine 0.55    Imagin/15/20 CT showed L portal vein thrombosis

## 2022-08-29 DIAGNOSIS — K83.9 BILIARY TRACT DISEASE: Primary | ICD-10-CM

## 2022-08-29 RX ORDER — URSODIOL 250 MG/1
250 TABLET, FILM COATED ORAL
Qty: 270 TABLET | Refills: 3 | Status: SHIPPED | OUTPATIENT
Start: 2022-08-29 | End: 2022-12-23

## 2022-10-03 ENCOUNTER — HEALTH MAINTENANCE LETTER (OUTPATIENT)
Age: 32
End: 2022-10-03

## 2022-12-16 DIAGNOSIS — K83.9 BILIARY TRACT DISEASE: Primary | ICD-10-CM

## 2022-12-23 DIAGNOSIS — K83.9 BILIARY TRACT DISEASE: ICD-10-CM

## 2022-12-23 RX ORDER — URSODIOL 250 MG/1
250 TABLET, FILM COATED ORAL
Qty: 270 TABLET | Refills: 3 | Status: SHIPPED | OUTPATIENT
Start: 2022-12-23 | End: 2023-12-29

## 2023-01-03 ENCOUNTER — VIRTUAL VISIT (OUTPATIENT)
Dept: GASTROENTEROLOGY | Facility: CLINIC | Age: 33
End: 2023-01-03
Attending: INTERNAL MEDICINE
Payer: COMMERCIAL

## 2023-01-03 VITALS — WEIGHT: 136 LBS

## 2023-01-03 DIAGNOSIS — K83.9 BILIARY TRACT DISEASE: Primary | ICD-10-CM

## 2023-01-03 PROCEDURE — 99214 OFFICE O/P EST MOD 30 MIN: CPT | Mod: 95 | Performed by: INTERNAL MEDICINE

## 2023-01-03 PROCEDURE — G0463 HOSPITAL OUTPT CLINIC VISIT: HCPCS | Mod: PN,GT | Performed by: INTERNAL MEDICINE

## 2023-01-03 ASSESSMENT — PAIN SCALES - GENERAL: PAINLEVEL: NO PAIN (0)

## 2023-01-03 NOTE — LETTER
1/3/2023         RE: Reyna Liriano  [address]  [facility] [address]        Dear Colleague,    Thank you for referring your patient, Reyna Liriano, to the [facility] [place]. Please see a copy of my visit note below.    Reyna is a 32 year old who is being evaluated via a billable video visit.      How would you like to obtain your AVS? MyChart  If the video visit is dropped, the invitation should be resent by: Text to cell phone: [phone]  Will anyone else be joining your video visit? No      Raquel Moreland    Video-Visit Details    Type of service:  Video Visit     Start 1034  End 1053    Originating Location (pt. Location): Home    Distant Location (provider location):  On-site  Platform used for Video Visit: [facility] Hepatology    Follow-up Visit    Follow-up visit for hepaticocholedocholithiasis    Subjective:  32 year old female    Hx choledochal cyst  - resection with hepaticojejunostomy ([place])   - laparoscopic cyst resection ([facility])   - recurrent cholangitis due to hepatolithiasis 2020  - ERCP 2020  - PTC 2020  - liver bx 2020- extrahepatic biliary obstruction, normal liver parenchyma, no hepatic fibrosis  - IR choledochoplasty 2020  - IR choledochoplasty 3/6/2020  - IR choledochoplasty 2020  - ERCP with [facility] 2020  - ERCP with [facility] 2020    Last visit 2022.  The patient delivered a healthy baby boy in 2022.  She underwent a  section at 36 weeks due to bleeding from placenta previa.  No new medications, ER visits or hospital admissions since last visit.    The patient is well today.  Her son was admitted to the hospital at the end of last year with RSV but is doing well.  She has not had any symptoms related to liver or biliary tract disease.    The patient denies abdominal pain, itch, jaundice, lower extremity edema, abdominal distention, lethargy or confusion.    The patient denies  melena, hematemesis or hematochezia.    The patient denies fever, sweats or chills.  She had RSV infection at the end of last year from her baby.  She has not had COVID-19 infection since last visit.  She is fully vaccinated against COVID-19 and seasonal influenza.    Weight is stable.  Appetite is normal.  The patient occasionally takes a supplement to aid with lactation.    The patient drinks a glass of wine every other week at most.    Saw Dr Traylor from [facility] in May 2022- discontinue enoxaparin after pregnancy    Medical hx Surgical hx   Past Medical History:   Diagnosis Date     Ascending cholangitis 02/10/2020     Choledochal cyst 2008     Choledochal cyst 2013     Portal vein thrombosis 02/15/2020      Past Surgical History:   Procedure Laterality Date     CHOLEDOCHAL CYST EXCISION  2008    [place]     CHOLEDOCHAL CYST EXCISION  2013    [facility]     ENDOSCOPIC RETROGRADE CHOLANGIOPANCREATOGRAPHY  05/27/2020    [facility]     ENDOSCOPIC RETROGRADE CHOLANGIOPANCREATOGRAPHY  05/07/2020    [facility]     HEPATICOJEJUNOSTOMY  2008     PERCUTANEOUS LIVER BIOPSY  02/28/2020     PERCUTANEOUS TRANSHEPATIC CHOLANGIOGRAHPY AND BILLIARY DRAINAGE  02/13/2020          Medications  Prior to Admission medications    Medication Sig Start Date End Date Taking? Authorizing Provider   Prenatal Vit-Fe Fumarate-FA (PRENATAL MULTIVITAMIN W/IRON) 27-0.8 MG tablet Take 1 tablet by mouth daily   Yes Reported, Patient   ursodiol (ACTIGALL) 250 MG tablet Take 1 tablet (250 mg) by mouth 3 times daily 12/23/22  Yes Nehemiah Maguire MD       Allergies  No Known Allergies    Review of systems  A 10-point review of systems was negative    Examination  Wt 61.7 kg (136 lb)     Gen- well, NAD, A+Ox3, normal color  Eye- EOMI, no scleral icterus  Skin- no rash or jaundice  Psych- normal mood    Laboratory  Lab Results   Component Value Date     04/18/2022    POTASSIUM 4.1 04/18/2022    CHLORIDE 107 04/18/2022    CO2 21 04/18/2022    BUN 11 04/18/2022     CR 0.55 04/18/2022       Lab Results   Component Value Date    BILITOTAL 0.3 04/18/2022    ALT 13 04/18/2022    AST 8 04/18/2022    ALKPHOS 71 04/18/2022       Lab Results   Component Value Date    ALBUMIN 3.2 04/18/2022    PROTTOTAL 7.2 04/18/2022        Lab Results   Component Value Date    WBC 7.9 04/18/2022    HGB 11.0 04/18/2022    MCV 93 04/18/2022     04/18/2022       Lab Results   Component Value Date    INR 0.93 04/18/2022       Radiology  Nil recent    Assessment  32-year-old female who presents for followup of hepatico- and choledocholithiasis.  No problems with hepaticolithiasis during pregnancy or in post-partum period.  Will check liver function tests, and monitor quarterly for now- no need for surveillance imaging if LFTs normal and patient is asymptomatic.  Will also continue ursodiol for now.  Will consider reducing frequency of blood draws and follow-up visits at next visit if patient is doing well.    Plan  1.  Check CMP, INR, CBC  2.  Check LFTs every 3 months  3.  Follow-up in 6 months (video visit)      I spent 30 minutes on the date of the encounter doing chart review, history and exam, documentation and further activities as noted above.          Again, thank you for allowing me to participate in the care of your patient.      Sincerely,    Nehemiah Parks MD

## 2023-01-03 NOTE — PROGRESS NOTES
Reyna is a 32 year old who is being evaluated via a billable video visit.      How would you like to obtain your AVS? MyChart  If the video visit is dropped, the invitation should be resent by: Text to cell phone: [phone]  Will anyone else be joining your video visit? No      Raquel Moreland    Video-Visit Details    Type of service:  Video Visit     Start 1034  End 1053    Originating Location (pt. Location): Home    Distant Location (provider location):  On-site  Platform used for Video Visit: [place]

## 2023-01-03 NOTE — PROGRESS NOTES
[facility] Hepatology    Follow-up Visit    Follow-up visit for hepaticocholedocholithiasis    Subjective:  32 year old female    Hx choledochal cyst  - resection with hepaticojejunostomy ([place])   - laparoscopic cyst resection ([facility])   - recurrent cholangitis due to hepatolithiasis 2020  - ERCP 2020  - PTC 2020  - liver bx 2020- extrahepatic biliary obstruction, normal liver parenchyma, no hepatic fibrosis  - IR choledochoplasty 2020  - IR choledochoplasty 3/6/2020  - IR choledochoplasty 2020  - ERCP with [facility] 2020  - ERCP with [facility] 2020    Last visit 2022.  The patient delivered a healthy baby boy in 2022.  She underwent a  section at 36 weeks due to bleeding from placenta previa.  No new medications, ER visits or hospital admissions since last visit.    The patient is well today.  Her son was admitted to the hospital at the end of last year with RSV but is doing well.  She has not had any symptoms related to liver or biliary tract disease.    The patient denies abdominal pain, itch, jaundice, lower extremity edema, abdominal distention, lethargy or confusion.    The patient denies melena, hematemesis or hematochezia.    The patient denies fever, sweats or chills.  She had RSV infection at the end of last year from her baby.  She has not had COVID-19 infection since last visit.  She is fully vaccinated against COVID-19 and seasonal influenza.    Weight is stable.  Appetite is normal.  The patient occasionally takes a supplement to aid with lactation.    The patient drinks a glass of wine every other week at most.    Saw Dr Traylor from [facility] in May 2022- discontinue enoxaparin after pregnancy    Medical hx Surgical hx   Past Medical History:   Diagnosis Date     Ascending cholangitis 02/10/2020     Choledochal cyst      Choledochal cyst 2013     Portal vein thrombosis 02/15/2020      Past Surgical History:   Procedure Laterality Date      CHOLEDOCHAL CYST EXCISION  2008    [place]     CHOLEDOCHAL CYST EXCISION  2013    [facility]     ENDOSCOPIC RETROGRADE CHOLANGIOPANCREATOGRAPHY  05/27/2020    [facility]     ENDOSCOPIC RETROGRADE CHOLANGIOPANCREATOGRAPHY  05/07/2020    [facility]     HEPATICOJEJUNOSTOMY  2008     PERCUTANEOUS LIVER BIOPSY  02/28/2020     PERCUTANEOUS TRANSHEPATIC CHOLANGIOGRAHPY AND BILLIARY DRAINAGE  02/13/2020          Medications  Prior to Admission medications    Medication Sig Start Date End Date Taking? Authorizing Provider   Prenatal Vit-Fe Fumarate-FA (PRENATAL MULTIVITAMIN W/IRON) 27-0.8 MG tablet Take 1 tablet by mouth daily   Yes Reported, Patient   ursodiol (ACTIGALL) 250 MG tablet Take 1 tablet (250 mg) by mouth 3 times daily 12/23/22  Yes Nehemiah Maguire MD       Allergies  No Known Allergies    Review of systems  A 10-point review of systems was negative    Examination  Wt 61.7 kg (136 lb)     Gen- well, NAD, A+Ox3, normal color  Eye- EOMI, no scleral icterus  Skin- no rash or jaundice  Psych- normal mood    Laboratory  Lab Results   Component Value Date     04/18/2022    POTASSIUM 4.1 04/18/2022    CHLORIDE 107 04/18/2022    CO2 21 04/18/2022    BUN 11 04/18/2022    CR 0.55 04/18/2022       Lab Results   Component Value Date    BILITOTAL 0.3 04/18/2022    ALT 13 04/18/2022    AST 8 04/18/2022    ALKPHOS 71 04/18/2022       Lab Results   Component Value Date    ALBUMIN 3.2 04/18/2022    PROTTOTAL 7.2 04/18/2022        Lab Results   Component Value Date    WBC 7.9 04/18/2022    HGB 11.0 04/18/2022    MCV 93 04/18/2022     04/18/2022       Lab Results   Component Value Date    INR 0.93 04/18/2022       Radiology  Nil recent    Assessment  32-year-old female who presents for followup of hepatico- and choledocholithiasis.  No problems with hepaticolithiasis during pregnancy or in post-partum period.  Will check liver function tests, and monitor quarterly for now- no need for surveillance imaging if LFTs normal and  patient is asymptomatic.  Will also continue ursodiol for now.  Will consider reducing frequency of blood draws and follow-up visits at next visit if patient is doing well.    Plan  1.  Check CMP, INR, CBC  2.  Check LFTs every 3 months  3.  Follow-up in 6 months (video visit)    Nehemiah Parks MD  Hepatology  [facility]    I spent 30 minutes on the date of the encounter doing chart review, history and exam, documentation and further activities as noted above.

## 2023-01-06 ENCOUNTER — TELEPHONE (OUTPATIENT)
Dept: GASTROENTEROLOGY | Facility: CLINIC | Age: 33
End: 2023-01-06

## 2023-01-06 NOTE — TELEPHONE ENCOUNTER
Patient not called; Provider template unavailable.  Please call to schedule  Return in about 6 months (around 7/3/2023) for Follow up, with me, using a video visit. When template is available.    Appointment type: 6 month follow up  Provider: Griffin Chapman MD  Return date: July 2023  Specialty phone number:n/a  Additional appointment(s) needed: n/a  Additonal Notes: n/a

## 2023-01-24 ENCOUNTER — TELEPHONE (OUTPATIENT)
Dept: GASTROENTEROLOGY | Facility: CLINIC | Age: 33
End: 2023-01-24
Payer: COMMERCIAL

## 2023-01-24 NOTE — TELEPHONE ENCOUNTER
LVM & sent MyChart message // pt needs to schedule return liver appt with Dr. Parks around 7.3.23 with labs prior // first attempt, AN 1.24.23

## 2023-01-25 ENCOUNTER — LAB (OUTPATIENT)
Dept: LAB | Facility: CLINIC | Age: 33
End: 2023-01-25
Payer: COMMERCIAL

## 2023-01-25 DIAGNOSIS — K83.9 BILIARY TRACT DISEASE: ICD-10-CM

## 2023-01-25 LAB
ALBUMIN SERPL BCG-MCNC: 4.8 G/DL (ref 3.5–5.2)
ALP SERPL-CCNC: 78 U/L (ref 35–104)
ALT SERPL W P-5'-P-CCNC: 16 U/L (ref 10–35)
AST SERPL W P-5'-P-CCNC: 21 U/L (ref 10–35)
BILIRUB DIRECT SERPL-MCNC: <0.2 MG/DL (ref 0–0.3)
BILIRUB SERPL-MCNC: 0.7 MG/DL
PROT SERPL-MCNC: 7.4 G/DL (ref 6.4–8.3)

## 2023-01-25 PROCEDURE — 36415 COLL VENOUS BLD VENIPUNCTURE: CPT

## 2023-01-25 PROCEDURE — 80076 HEPATIC FUNCTION PANEL: CPT

## 2023-01-26 NOTE — TELEPHONE ENCOUNTER
LVM & sent MyChart message // pt needs to schedule return liver appt with Dr. Parks around 7.3.23 with labs prior // second attempt, AN 1.26.23

## 2023-07-06 ENCOUNTER — LAB (OUTPATIENT)
Dept: LAB | Facility: CLINIC | Age: 33
End: 2023-07-06
Payer: COMMERCIAL

## 2023-07-06 DIAGNOSIS — K83.9 BILIARY TRACT DISEASE: ICD-10-CM

## 2023-07-06 LAB
ALBUMIN SERPL BCG-MCNC: 4.7 G/DL (ref 3.5–5.2)
ALP SERPL-CCNC: 59 U/L (ref 35–104)
ALT SERPL W P-5'-P-CCNC: 14 U/L (ref 0–50)
AST SERPL W P-5'-P-CCNC: 25 U/L (ref 0–45)
BILIRUB DIRECT SERPL-MCNC: <0.2 MG/DL (ref 0–0.3)
BILIRUB SERPL-MCNC: 0.6 MG/DL
PROT SERPL-MCNC: 7.7 G/DL (ref 6.4–8.3)

## 2023-07-06 PROCEDURE — 80076 HEPATIC FUNCTION PANEL: CPT

## 2023-07-06 PROCEDURE — 36415 COLL VENOUS BLD VENIPUNCTURE: CPT

## 2023-07-12 ENCOUNTER — VIRTUAL VISIT (OUTPATIENT)
Dept: GASTROENTEROLOGY | Facility: CLINIC | Age: 33
End: 2023-07-12
Attending: INTERNAL MEDICINE
Payer: COMMERCIAL

## 2023-07-12 DIAGNOSIS — K83.9 BILIARY TRACT DISEASE: Primary | ICD-10-CM

## 2023-07-12 PROCEDURE — 99214 OFFICE O/P EST MOD 30 MIN: CPT | Mod: VID | Performed by: INTERNAL MEDICINE

## 2023-07-12 RX ORDER — DOXYCYCLINE HYCLATE 100 MG
1 TABLET ORAL
COMMUNITY
Start: 2023-07-06 | End: 2024-01-29

## 2023-07-12 NOTE — LETTER
7/12/2023         RE: Reyna Liriano  [address] [facility] [address]        Dear Colleague,    Thank you for referring your patient, Reyna Liriano, to the [facility] HEPATOLOGY CLINIC [place]. Please see a copy of my visit note below.    [facility]at[facility]    Follow-up Visit    Follow-up visit for hepaticocholedocholithiasis    Subjective:  32 year old female    Hx choledochal cyst  - resection with hepaticojejunostomy ([place]) 2008  - laparoscopic cyst resection ([facility]) 2013  - recurrent cholangitis due to hepatolithiasis Feb 2020  - ERCP 2/11/2020  - PTC 2/13/2020  - liver bx 2/28/2020- extrahepatic biliary obstruction, normal liver parenchyma, no hepatic fibrosis  - IR choledochoplasty 2/28/2020  - IR choledochoplasty 3/6/2020  - IR choledochoplasty 4/24/2020  - ERCP with [facility] 5/7/2020  - ERCP with [facility] 5/27/2020    Last visit January 2023.  No new medications, ER visits or hospital admissions since last visit.    Patient is well today.  She has no symptoms related to biliary obstruction.  She and her  would like to have a second child and are in the process of considering IVF treatment.    Patient denies abdominal pain, which jaundice, lower extremity edema, abdominal distension, lethargy or confusion.    Patient denies melena, hematemesis or hematochezia.    Patient denies fevers, sweats or chills.      Weight stable.  Appetite is normal.  Patient drinks 1 to 2 glasses of wine per week at most.  Her son is doing well-he is at 14 months and meeting all his milestones.    Medical hx Surgical hx   Past Medical History:   Diagnosis Date     Ascending cholangitis 02/10/2020     Choledochal cyst 2008     Choledochal cyst 2013     Portal vein thrombosis 02/15/2020      Past Surgical History:   Procedure Laterality Date     CHOLEDOCHAL CYST EXCISION  2008    [place]     CHOLEDOCHAL CYST EXCISION  2013    [facility]     ENDOSCOPIC RETROGRADE CHOLANGIOPANCREATOGRAPHY  05/27/2020     [facility]     ENDOSCOPIC RETROGRADE CHOLANGIOPANCREATOGRAPHY  05/07/2020    [facility]     HEPATICOJEJUNOSTOMY  2008     PERCUTANEOUS LIVER BIOPSY  02/28/2020     PERCUTANEOUS TRANSHEPATIC CHOLANGIOGRAHPY AND BILLIARY DRAINAGE  02/13/2020          Medications  Prior to Admission medications    Medication Sig Start Date End Date Taking? Authorizing Provider   Prenatal Vit-Fe Fumarate-FA (PRENATAL MULTIVITAMIN W/IRON) 27-0.8 MG tablet Take 1 tablet by mouth daily   Yes Reported, Patient   ursodiol (ACTIGALL) 250 MG tablet Take 1 tablet (250 mg) by mouth 3 times daily 12/23/22  Yes Nehemiah Maguire MD   doxycycline hyclate (VIBRA-TABS) 100 MG tablet Take 1 tablet by mouth 2 times daily 7/6/23   Reported, Patient       Allergies  No Known Allergies    Review of systems  A 10-point review of systems was negative    Examination  Gen- well, NAD, A+Ox3, normal color  Eye- EOMI, no scleral icterus  Skin- no rash or jaundice  Psych- normal mood    Laboratory  Lab Results   Component Value Date     04/18/2022    POTASSIUM 4.1 04/18/2022    CHLORIDE 107 04/18/2022    CO2 21 04/18/2022    BUN 11 04/18/2022    CR 0.55 04/18/2022       Lab Results   Component Value Date    BILITOTAL 0.6 07/06/2023    ALT 14 07/06/2023    AST 25 07/06/2023    ALKPHOS 59 07/06/2023       Lab Results   Component Value Date    ALBUMIN 4.7 07/06/2023    ALBUMIN 3.2 04/18/2022    PROTTOTAL 7.7 07/06/2023        Lab Results   Component Value Date    WBC 7.9 04/18/2022    HGB 11.0 04/18/2022    MCV 93 04/18/2022     04/18/2022       Lab Results   Component Value Date    INR 0.93 04/18/2022       Radiology  Nil recent    Assessment  32-year-old female who presents for follow-up of hepaticocholedocholithiasis.  Liver function tests normal on ursodiol.  No evidence of biliary obstruction or recurrent stones.  Will monitor liver function tests a little more closely as the patient prepares for individual therapy elicitation treatment.  Will consider  reducing frequency of visits and possible tapering off ursodiol at a later time.    Plan  1.  Check LFTs every 3 months  2.  Continue ursodiol at current dose  3.  No contraindication to IVF  4.  Follow-up with me in 6 months    Nehemiah Parks MD  Hepatology  [facility]    I spent 30 minutes on the date of the encounter doing chart review, history and exam, documentation and further activities as noted above.    Virtual Visit Details    Type of service:  Video Visit     Originating Location (pt. Location): Home  Start 1016  End 1030  Distant Location (provider location):  On-site  Platform used for Video Visit: Well      Again, thank you for allowing me to participate in the care of your patient.        Sincerely,        Nehemiah Parks MD

## 2023-07-12 NOTE — PROGRESS NOTES
[facility] Hepatology    Follow-up Visit    Follow-up visit for hepaticocholedocholithiasis    Subjective:  32 year old female    Hx choledochal cyst  - resection with hepaticojejunostomy ([place]) 2008  - laparoscopic cyst resection ([facility]) 2013  - recurrent cholangitis due to hepatolithiasis Feb 2020  - ERCP 2/11/2020  - PTC 2/13/2020  - liver bx 2/28/2020- extrahepatic biliary obstruction, normal liver parenchyma, no hepatic fibrosis  - IR choledochoplasty 2/28/2020  - IR choledochoplasty 3/6/2020  - IR choledochoplasty 4/24/2020  - ERCP with [facility] 5/7/2020  - ERCP with [facility] 5/27/2020    Last visit January 2023.  No new medications, ER visits or hospital admissions since last visit.    Patient is well today.  She has no symptoms related to biliary obstruction.  She and her  would like to have a second child and are in the process of considering IVF treatment.    Patient denies abdominal pain, which jaundice, lower extremity edema, abdominal distension, lethargy or confusion.    Patient denies melena, hematemesis or hematochezia.    Patient denies fevers, sweats or chills.      Weight stable.  Appetite is normal.  Patient drinks 1 to 2 glasses of wine per week at most.  Her son is doing well-he is at 14 months and meeting all his milestones.    Medical hx Surgical hx   Past Medical History:   Diagnosis Date     Ascending cholangitis 02/10/2020     Choledochal cyst 2008     Choledochal cyst 2013     Portal vein thrombosis 02/15/2020      Past Surgical History:   Procedure Laterality Date     CHOLEDOCHAL CYST EXCISION  2008    [place]     CHOLEDOCHAL CYST EXCISION  2013    [facility]     ENDOSCOPIC RETROGRADE CHOLANGIOPANCREATOGRAPHY  05/27/2020    [facility]     ENDOSCOPIC RETROGRADE CHOLANGIOPANCREATOGRAPHY  05/07/2020    [facility]     HEPATICOJEJUNOSTOMY  2008     PERCUTANEOUS LIVER BIOPSY  02/28/2020     PERCUTANEOUS TRANSHEPATIC CHOLANGIOGRAHPY AND BILLIARY DRAINAGE  02/13/2020          Medications  Prior to Admission  medications    Medication Sig Start Date End Date Taking? Authorizing Provider   Prenatal Vit-Fe Fumarate-FA (PRENATAL MULTIVITAMIN W/IRON) 27-0.8 MG tablet Take 1 tablet by mouth daily   Yes Reported, Patient   ursodiol (ACTIGALL) 250 MG tablet Take 1 tablet (250 mg) by mouth 3 times daily 12/23/22  Yes Nehemiah Maguire MD   doxycycline hyclate (VIBRA-TABS) 100 MG tablet Take 1 tablet by mouth 2 times daily 7/6/23   Reported, Patient       Allergies  No Known Allergies    Review of systems  A 10-point review of systems was negative    Examination  Gen- well, NAD, A+Ox3, normal color  Eye- EOMI, no scleral icterus  Skin- no rash or jaundice  Psych- normal mood    Laboratory  Lab Results   Component Value Date     04/18/2022    POTASSIUM 4.1 04/18/2022    CHLORIDE 107 04/18/2022    CO2 21 04/18/2022    BUN 11 04/18/2022    CR 0.55 04/18/2022       Lab Results   Component Value Date    BILITOTAL 0.6 07/06/2023    ALT 14 07/06/2023    AST 25 07/06/2023    ALKPHOS 59 07/06/2023       Lab Results   Component Value Date    ALBUMIN 4.7 07/06/2023    ALBUMIN 3.2 04/18/2022    PROTTOTAL 7.7 07/06/2023        Lab Results   Component Value Date    WBC 7.9 04/18/2022    HGB 11.0 04/18/2022    MCV 93 04/18/2022     04/18/2022       Lab Results   Component Value Date    INR 0.93 04/18/2022       Radiology  Nil recent    Assessment  32-year-old female who presents for follow-up of hepaticocholedocholithiasis.  Liver function tests normal on ursodiol.  No evidence of biliary obstruction or recurrent stones.  Will monitor liver function tests a little more closely as the patient prepares for individual therapy elicitation treatment.  Will consider reducing frequency of visits and possible tapering off ursodiol at a later time.    Plan  1.  Check LFTs every 3 months  2.  Continue ursodiol at current dose  3.  No contraindication to IVF  4.  Follow-up with me in 6 months    Nehemiah Parks MD  Hepatology  [facility]  [facility]    I spent 30 minutes on the date of the encounter doing chart review, history and exam, documentation and further activities as noted above.    Virtual Visit Details    Type of service:  Video Visit     Originating Location (pt. Location): Home  Start 1016  End 1030  Distant Location (provider location):  On-site  Platform used for Video Visit: [employer]

## 2023-12-28 DIAGNOSIS — K83.9 BILIARY TRACT DISEASE: ICD-10-CM

## 2023-12-29 RX ORDER — URSODIOL 250 MG/1
250 TABLET, FILM COATED ORAL 3 TIMES DAILY
Qty: 270 TABLET | Refills: 1 | Status: SHIPPED | OUTPATIENT
Start: 2023-12-29 | End: 2024-01-29

## 2023-12-30 ENCOUNTER — HEALTH MAINTENANCE LETTER (OUTPATIENT)
Age: 33
End: 2023-12-30

## 2024-01-29 ENCOUNTER — LAB (OUTPATIENT)
Dept: LAB | Facility: CLINIC | Age: 34
End: 2024-01-29
Attending: INTERNAL MEDICINE
Payer: COMMERCIAL

## 2024-01-29 ENCOUNTER — OFFICE VISIT (OUTPATIENT)
Dept: GASTROENTEROLOGY | Facility: CLINIC | Age: 34
End: 2024-01-29
Attending: INTERNAL MEDICINE
Payer: COMMERCIAL

## 2024-01-29 VITALS
SYSTOLIC BLOOD PRESSURE: 116 MMHG | OXYGEN SATURATION: 100 % | HEART RATE: 61 BPM | WEIGHT: 147.1 LBS | DIASTOLIC BLOOD PRESSURE: 77 MMHG

## 2024-01-29 DIAGNOSIS — K83.9 BILIARY TRACT DISEASE: ICD-10-CM

## 2024-01-29 LAB
ALBUMIN SERPL BCG-MCNC: 4.3 G/DL (ref 3.5–5.2)
ALP SERPL-CCNC: 35 U/L (ref 40–150)
ALT SERPL W P-5'-P-CCNC: 7 U/L (ref 0–50)
ANION GAP SERPL CALCULATED.3IONS-SCNC: 9 MMOL/L (ref 7–15)
AST SERPL W P-5'-P-CCNC: 13 U/L (ref 0–45)
BILIRUB DIRECT SERPL-MCNC: <0.2 MG/DL (ref 0–0.3)
BILIRUB SERPL-MCNC: 0.4 MG/DL
BUN SERPL-MCNC: 10.8 MG/DL (ref 6–20)
CALCIUM SERPL-MCNC: 9.4 MG/DL (ref 8.6–10)
CHLORIDE SERPL-SCNC: 102 MMOL/L (ref 98–107)
CREAT SERPL-MCNC: 0.57 MG/DL (ref 0.51–0.95)
DEPRECATED HCO3 PLAS-SCNC: 24 MMOL/L (ref 22–29)
EGFRCR SERPLBLD CKD-EPI 2021: >90 ML/MIN/1.73M2
ERYTHROCYTE [DISTWIDTH] IN BLOOD BY AUTOMATED COUNT: 12.7 % (ref 10–15)
GLUCOSE SERPL-MCNC: 89 MG/DL (ref 70–99)
HCT VFR BLD AUTO: 33.8 % (ref 35–47)
HGB BLD-MCNC: 11.5 G/DL (ref 11.7–15.7)
INR PPP: 1 (ref 0.85–1.15)
MCH RBC QN AUTO: 29.8 PG (ref 26.5–33)
MCHC RBC AUTO-ENTMCNC: 34 G/DL (ref 31.5–36.5)
MCV RBC AUTO: 88 FL (ref 78–100)
PLATELET # BLD AUTO: 284 10E3/UL (ref 150–450)
POTASSIUM SERPL-SCNC: 4.4 MMOL/L (ref 3.4–5.3)
PROT SERPL-MCNC: 7.4 G/DL (ref 6.4–8.3)
RBC # BLD AUTO: 3.86 10E6/UL (ref 3.8–5.2)
SODIUM SERPL-SCNC: 135 MMOL/L (ref 135–145)
WBC # BLD AUTO: 9.5 10E3/UL (ref 4–11)

## 2024-01-29 PROCEDURE — 80053 COMPREHEN METABOLIC PANEL: CPT | Performed by: PATHOLOGY

## 2024-01-29 PROCEDURE — 85027 COMPLETE CBC AUTOMATED: CPT | Performed by: PATHOLOGY

## 2024-01-29 PROCEDURE — 36415 COLL VENOUS BLD VENIPUNCTURE: CPT | Performed by: PATHOLOGY

## 2024-01-29 PROCEDURE — 99213 OFFICE O/P EST LOW 20 MIN: CPT | Performed by: INTERNAL MEDICINE

## 2024-01-29 PROCEDURE — 82248 BILIRUBIN DIRECT: CPT | Performed by: PATHOLOGY

## 2024-01-29 PROCEDURE — 85610 PROTHROMBIN TIME: CPT | Performed by: PATHOLOGY

## 2024-01-29 PROCEDURE — 99214 OFFICE O/P EST MOD 30 MIN: CPT | Performed by: INTERNAL MEDICINE

## 2024-01-29 RX ORDER — ENOXAPARIN SODIUM 100 MG/ML
40 INJECTION SUBCUTANEOUS DAILY
COMMUNITY
Start: 2024-01-17

## 2024-01-29 RX ORDER — URSODIOL 250 MG/1
250 TABLET, FILM COATED ORAL 2 TIMES DAILY
Start: 2024-01-29

## 2024-01-29 NOTE — PATIENT INSTRUCTIONS
Plan  Blood work today  Reduce ursodiol to twice per day  Check blood work every 3 months  Follow-up with me in 6 months    Nehemiah Parks MD  Hepatology

## 2024-01-29 NOTE — PROGRESS NOTES
[facility] Hepatology    Follow-up Visit    Follow-up visit for hepatico-choledocholithiasis    Subjective:  33 year old female    Hx choledochal cyst  - resection with hepaticojejunostomy ([place]) 2008  - laparoscopic cyst resection ([facility]) 2013  - recurrent cholangitis due to hepatolithiasis Feb 2020  - ERCP 2/11/2020  - PTC 2/13/2020  - liver bx 2/28/2020- extrahepatic biliary obstruction, normal liver parenchyma, no hepatic fibrosis  - IR choledochoplasty 2/28/2020  - IR choledochoplasty 3/6/2020  - IR choledochoplasty 4/24/2020  - ERCP with [facility] 5/7/2020  - ERCP with [facility] 5/27/2020    Last visit July 2023.  Patient started hormonal therapy for IVF in October 2023 with implantation in November 2023.  Patient is currently 14 weeks pregnant.  She recently completed hormonal treatment.  Patient has resumed enoxaparin for the duration of her pregnancy.  This is being managed by her obstetrician.  No ER visits or hospitalizations since last visit.    Patient is well today.  She has no symptoms related to liver or biliary tract disease.  Her pregnancy has been going as expected.  Patient's due date is in August 2024 (it is a girl).    Patient denies itch, jaundice, lower extremity edema, abdominal distension, lethargy or confusion.    Patient denies melena, hematemesis or hematochezia.    Patient denies fevers, sweats or chills.  Patient has had her seasonal influenza and COVID-19 vaccinations.    Weight stable.  Appetite is normal.    Patient has stopped drinking alcohol since getting pregnant.  She continues to work with [employer] and will have 6 months of maternity leave.      Medical hx Surgical hx   Past Medical History:   Diagnosis Date     Ascending cholangitis (H28) 02/10/2020     Choledochal cyst 2008     Choledochal cyst 2013     Portal vein thrombosis 02/15/2020      Past Surgical History:   Procedure Laterality Date     CHOLEDOCHAL CYST EXCISION  2008    [place]     CHOLEDOCHAL CYST EXCISION   2013    [facility]     ENDOSCOPIC RETROGRADE CHOLANGIOPANCREATOGRAPHY  05/27/2020    [facility]     ENDOSCOPIC RETROGRADE CHOLANGIOPANCREATOGRAPHY  05/07/2020    [facility]     HEPATICOJEJUNOSTOMY  2008     PERCUTANEOUS LIVER BIOPSY  02/28/2020     PERCUTANEOUS TRANSHEPATIC CHOLANGIOGRAHPY AND BILLIARY DRAINAGE  02/13/2020          Medications  Prior to Admission medications    Medication Sig Start Date End Date Taking? Authorizing Provider   enoxaparin ANTICOAGULANT (LOVENOX) 40 MG/0.4ML syringe Inject 40 mg Subcutaneous daily 1/17/24  Yes Reported, Patient   Prenatal Vit-Fe Fumarate-FA (PRENATAL MULTIVITAMIN W/IRON) 27-0.8 MG tablet Take 1 tablet by mouth daily   Yes Reported, Patient   ursodiol (ACTIGALL) 250 MG tablet Take 1 tablet (250 mg) by mouth 2 times daily 1/29/24  Yes Nehemiah Maguire MD       Allergies  No Known Allergies    Review of systems  A 10-point review of systems was negative    Examination  /77   Pulse 61   Wt 66.7 kg (147 lb 1.6 oz)   SpO2 100%   There is no height or weight on file to calculate BMI.    Gen- well, NAD, A+Ox3, normal color  CVS- RRR  RS- CTA  Abd- laparoscopy scars, SNT, no ascites or organomegaly on palpation or percussion, BS+  Extr- hands normal, no NATALIA  Skin- no rash or jaundice  Neuro- no asterixis  Psych- normal mood    Laboratory  Lab Results   Component Value Date     04/18/2022    POTASSIUM 4.1 04/18/2022    CHLORIDE 107 04/18/2022    CO2 21 04/18/2022    BUN 11 04/18/2022    CR 0.55 04/18/2022       Lab Results   Component Value Date    BILITOTAL 0.6 07/06/2023    ALT 14 07/06/2023    AST 25 07/06/2023    ALKPHOS 59 07/06/2023       Lab Results   Component Value Date    ALBUMIN 4.7 07/06/2023    ALBUMIN 3.2 04/18/2022    PROTTOTAL 7.7 07/06/2023        Lab Results   Component Value Date    WBC 7.9 04/18/2022    HGB 11.0 04/18/2022    MCV 93 04/18/2022     04/18/2022       Lab Results   Component Value Date    INR 0.93 04/18/2022       Radiology  Nil  recent    Assessment  33 year old female who presents for follow-up of hepatico-choledocholithiasis and pregnancy.  Will recheck liver function tests today.  Will monitor liver function tests every 3 months for duration of pregnancy.  Will reduce ursodiol frequency to twice daily to help with adherence.  Will follow-up in 6 months prior to patient's due date to confirm good liver function prior to delivery.    Plan  1. Check CMP, INR, CBC  2. Check LFTs every 3 months  3. Reduce ursodiol to 250mg PO BID  4. Follow-up in 6 months    Nehemiah Parks MD  Hepatology  [facility]    I spent 30 minutes on the date of the encounter doing chart review, history and exam, documentation and further activities as noted above.

## 2024-01-29 NOTE — LETTER
1/29/2024         RE: Reyna Liriano  [address] [facility] [address]        Dear Colleague,    Thank you for referring your patient, Reyna Liriano, to the [facility] HEPATOLOGY CLINIC [place]. Please see a copy of my visit note below.    [facility] Hepatology    Follow-up Visit    Follow-up visit for hepatico-choledocholithiasis    Subjective:  33 year old female    Hx choledochal cyst  - resection with hepaticojejunostomy ([place]) 2008  - laparoscopic cyst resection ([facility]) 2013  - recurrent cholangitis due to hepatolithiasis Feb 2020  - ERCP 2/11/2020  - PTC 2/13/2020  - liver bx 2/28/2020- extrahepatic biliary obstruction, normal liver parenchyma, no hepatic fibrosis  - IR choledochoplasty 2/28/2020  - IR choledochoplasty 3/6/2020  - IR choledochoplasty 4/24/2020  - ERCP with [facility] 5/7/2020  - ERCP with [facility] 5/27/2020    Last visit July 2023.  Patient started hormonal therapy for IVF in October 2023 with implantation in November 2023.  Patient is currently 14 weeks pregnant.  She recently completed hormonal treatment.  Patient has resumed enoxaparin for the duration of her pregnancy.  This is being managed by her obstetrician.  No ER visits or hospitalizations since last visit.    Patient is well today.  She has no symptoms related to liver or biliary tract disease.  Her pregnancy has been going as expected.  Patient's due date is in August 2024 (it is a girl).    Patient denies itch, jaundice, lower extremity edema, abdominal distension, lethargy or confusion.    Patient denies melena, hematemesis or hematochezia.    Patient denies fevers, sweats or chills.  Patient has had her seasonal influenza and COVID-19 vaccinations.    Weight stable.  Appetite is normal.    Patient has stopped drinking alcohol since getting pregnant.  She continues to work with [employer] and will have 6 months of maternity leave.      Medical hx Surgical hx   Past Medical History:   Diagnosis Date      Ascending cholangitis (H28) 02/10/2020     Choledochal cyst 2008     Choledochal cyst 2013     Portal vein thrombosis 02/15/2020      Past Surgical History:   Procedure Laterality Date     CHOLEDOCHAL CYST EXCISION  2008    [place]     CHOLEDOCHAL CYST EXCISION  2013    [facility]     ENDOSCOPIC RETROGRADE CHOLANGIOPANCREATOGRAPHY  05/27/2020    [facility]     ENDOSCOPIC RETROGRADE CHOLANGIOPANCREATOGRAPHY  05/07/2020    [facility]     HEPATICOJEJUNOSTOMY  2008     PERCUTANEOUS LIVER BIOPSY  02/28/2020     PERCUTANEOUS TRANSHEPATIC CHOLANGIOGRAHPY AND BILLIARY DRAINAGE  02/13/2020          Medications  Prior to Admission medications    Medication Sig Start Date End Date Taking? Authorizing Provider   enoxaparin ANTICOAGULANT (LOVENOX) 40 MG/0.4ML syringe Inject 40 mg Subcutaneous daily 1/17/24  Yes Reported, Patient   Prenatal Vit-Fe Fumarate-FA (PRENATAL MULTIVITAMIN W/IRON) 27-0.8 MG tablet Take 1 tablet by mouth daily   Yes Reported, Patient   ursodiol (ACTIGALL) 250 MG tablet Take 1 tablet (250 mg) by mouth 2 times daily 1/29/24  Yes Nehemiah Maguire MD       Allergies  No Known Allergies    Review of systems  A 10-point review of systems was negative    Examination  /77   Pulse 61   Wt 66.7 kg (147 lb 1.6 oz)   SpO2 100%   There is no height or weight on file to calculate BMI.    Gen- well, NAD, A+Ox3, normal color  CVS- RRR  RS- CTA  Abd- laparoscopy scars, SNT, no ascites or organomegaly on palpation or percussion, BS+  Extr- hands normal, no NATALIA  Skin- no rash or jaundice  Neuro- no asterixis  Psych- normal mood    Laboratory  Lab Results   Component Value Date     04/18/2022    POTASSIUM 4.1 04/18/2022    CHLORIDE 107 04/18/2022    CO2 21 04/18/2022    BUN 11 04/18/2022    CR 0.55 04/18/2022       Lab Results   Component Value Date    BILITOTAL 0.6 07/06/2023    ALT 14 07/06/2023    AST 25 07/06/2023    ALKPHOS 59 07/06/2023       Lab Results   Component Value Date    ALBUMIN 4.7 07/06/2023    ALBUMIN  3.2 04/18/2022    PROTTOTAL 7.7 07/06/2023        Lab Results   Component Value Date    WBC 7.9 04/18/2022    HGB 11.0 04/18/2022    MCV 93 04/18/2022     04/18/2022       Lab Results   Component Value Date    INR 0.93 04/18/2022       Radiology  Nil recent    Assessment  33 year old female who presents for follow-up of hepatico-choledocholithiasis and pregnancy.  Will recheck liver function tests today.  Will monitor liver function tests every 3 months for duration of pregnancy.  Will reduce ursodiol frequency to twice daily to help with adherence.  Will follow-up in 6 months prior to patient's due date to confirm good liver function prior to delivery.    Plan  Check CMP, INR, CBC  Check LFTs every 3 months  Reduce ursodiol to 250mg PO BID  Follow-up in 6 months    Nehemiah aPrks MD  Hepatology  [facility]    I spent 30 minutes on the date of the encounter doing chart review, history and exam, documentation and further activities as noted above.      Again, thank you for allowing me to participate in the care of your patient.        Sincerely,        Nehemiah Parks MD

## 2024-03-06 ENCOUNTER — LAB (OUTPATIENT)
Dept: LAB | Facility: CLINIC | Age: 34
End: 2024-03-06
Payer: COMMERCIAL

## 2024-03-06 DIAGNOSIS — K83.9 BILIARY TRACT DISEASE: ICD-10-CM

## 2024-03-06 LAB
ALBUMIN SERPL BCG-MCNC: 4.1 G/DL (ref 3.5–5.2)
ALP SERPL-CCNC: 33 U/L (ref 40–150)
ALT SERPL W P-5'-P-CCNC: 9 U/L (ref 0–50)
AST SERPL W P-5'-P-CCNC: 10 U/L (ref 0–45)
BILIRUB DIRECT SERPL-MCNC: <0.2 MG/DL (ref 0–0.3)
BILIRUB SERPL-MCNC: 0.3 MG/DL
PROT SERPL-MCNC: 6.9 G/DL (ref 6.4–8.3)

## 2024-03-06 PROCEDURE — 80076 HEPATIC FUNCTION PANEL: CPT

## 2024-03-06 PROCEDURE — 36415 COLL VENOUS BLD VENIPUNCTURE: CPT

## 2024-06-14 ENCOUNTER — LAB (OUTPATIENT)
Dept: LAB | Facility: CLINIC | Age: 34
End: 2024-06-14
Payer: COMMERCIAL

## 2024-06-14 DIAGNOSIS — K83.9 BILIARY TRACT DISEASE: ICD-10-CM

## 2024-06-14 LAB
ALBUMIN SERPL BCG-MCNC: 3.9 G/DL (ref 3.5–5.2)
ALP SERPL-CCNC: 99 U/L (ref 40–150)
ALT SERPL W P-5'-P-CCNC: 9 U/L (ref 0–50)
AST SERPL W P-5'-P-CCNC: 14 U/L (ref 0–45)
BILIRUB DIRECT SERPL-MCNC: <0.2 MG/DL (ref 0–0.3)
BILIRUB SERPL-MCNC: 0.2 MG/DL
PROT SERPL-MCNC: 7.4 G/DL (ref 6.4–8.3)

## 2024-06-14 PROCEDURE — 80076 HEPATIC FUNCTION PANEL: CPT

## 2024-06-14 PROCEDURE — 36415 COLL VENOUS BLD VENIPUNCTURE: CPT

## 2024-06-18 ENCOUNTER — VIRTUAL VISIT (OUTPATIENT)
Dept: GASTROENTEROLOGY | Facility: CLINIC | Age: 34
End: 2024-06-18
Attending: INTERNAL MEDICINE
Payer: COMMERCIAL

## 2024-06-18 DIAGNOSIS — K83.9 BILIARY TRACT DISEASE: Primary | ICD-10-CM

## 2024-06-18 PROCEDURE — 99214 OFFICE O/P EST MOD 30 MIN: CPT | Mod: 95 | Performed by: INTERNAL MEDICINE

## 2024-06-18 NOTE — PROGRESS NOTES
[facility] Hepatology    Follow-up Visit    Follow-up visit for hepaticolithiasis    Subjective:  33 year old female    Hx choledochal cyst  - resection with hepaticojejunostomy ([place])   - laparoscopic cyst resection ([facility])   - recurrent cholangitis due to hepatolithiasis 2020  - ERCP 2020  - PTC 2020  - liver bx 2020- extrahepatic biliary obstruction, normal liver parenchyma, no hepatic fibrosis  - IR choledochoplasty 2020  - IR choledochoplasty 3/6/2020  - IR choledochoplasty 2020  - ERCP with [facility] 2020  - ERCP with [facility] 2020    Last visit 2024.  No new medications, ER visits or hospital admissions since last visit.    Patient is well today.  She is now 34 weeks pregnant.  Patient's due date is  with a plan to do a  at 39 weeks.    Patient reports increasing issues with heartburn with limited response to Tums.  She denies melena, hematemesis or hematochezia.    Patient denies abdominal pain, nausea or vomiting.    Patient denies jaundice, lower extremity edema, abdominal distension, lethargy or confusion.    Patient denies fevers, sweats or chills.      Weight has increased appropriately with dates.  Appetite is good.    Patient does not drink alcohol.      Medical hx Surgical hx   Past Medical History:   Diagnosis Date    Ascending cholangitis (H28) 02/10/2020    Choledochal cyst 2008    Choledochal cyst 2013    Portal vein thrombosis 02/15/2020      Past Surgical History:   Procedure Laterality Date    CHOLEDOCHAL CYST EXCISION      [place]    CHOLEDOCHAL CYST EXCISION      [facility]    ENDOSCOPIC RETROGRADE CHOLANGIOPANCREATOGRAPHY  2020    [facility]    ENDOSCOPIC RETROGRADE CHOLANGIOPANCREATOGRAPHY  2020    [facility]    HEPATICOJEJUNOSTOMY  2008    PERCUTANEOUS LIVER BIOPSY  2020    PERCUTANEOUS TRANSHEPATIC CHOLANGIOGRAHPY AND BILLIARY DRAINAGE  2020          Medications  Prior to Admission medications     Medication Sig Start Date End Date Taking? Authorizing Provider   enoxaparin ANTICOAGULANT (LOVENOX) 40 MG/0.4ML syringe Inject 40 mg Subcutaneous daily 1/17/24  Yes Reported, Patient   Prenatal Vit-Fe Fumarate-FA (PRENATAL MULTIVITAMIN W/IRON) 27-0.8 MG tablet Take 1 tablet by mouth daily   Yes Reported, Patient   ursodiol (ACTIGALL) 250 MG tablet Take 1 tablet (250 mg) by mouth 2 times daily 1/29/24  Yes Nehemiah Maguire MD       Allergies  No Known Allergies    Review of systems  A 10-point review of systems was negative    Examination  Gen- well, NAD, A+Ox3, normal color  Eye- EOMI  Skin- no rash or jaundice  Psych- normal mood    Laboratory  Lab Results   Component Value Date     01/29/2024    POTASSIUM 4.4 01/29/2024    POTASSIUM 4.1 04/18/2022    CHLORIDE 102 01/29/2024    CHLORIDE 107 04/18/2022    CO2 24 01/29/2024    CO2 21 04/18/2022    BUN 10.8 01/29/2024    BUN 11 04/18/2022    CR 0.57 01/29/2024       Lab Results   Component Value Date    BILITOTAL 0.2 06/14/2024    ALT 9 06/14/2024    AST 14 06/14/2024    ALKPHOS 99 06/14/2024       Lab Results   Component Value Date    ALBUMIN 3.9 06/14/2024    ALBUMIN 3.2 04/18/2022    PROTTOTAL 7.4 06/14/2024        Lab Results   Component Value Date    WBC 9.5 01/29/2024    HGB 11.5 01/29/2024    MCV 88 01/29/2024     01/29/2024       Lab Results   Component Value Date    INR 1.00 01/29/2024       Radiology  Nil recent    Assessment  33 year old female with history of choledochal cyst and hepaticolithiasis who is 34 weeks pregnant and presents for follow-up.  Liver function tests normal.  No clinical evidence of recurrence of hepaticolithiasis.  Will continue ursodiol and monitor clinically.  No additional blood work required prior to due date.    Plan  OK to use famotidine for GERD  Continue ursodiol 250mg PO BID  Follow-up in 6 months    Nehemiah Parks MD  Hepatology  [facility]    I spent 30 minutes on the date of the encounter doing  chart review, history and exam, documentation and further activities as noted above.   ________________________________________    Virtual Visit Details    Type of service:  Video Visit     Originating Location (pt. Location): Home  Distant Location (provider location):  On-site  Platform used for Video Visit: [facility]    Start 1033  End 7452

## 2024-06-18 NOTE — NURSING NOTE
Is the patient currently in the state of [place]? YES    Visit mode:VIDEO    If the visit is dropped, the patient can be reconnected by: VIDEO VISIT: Text to cell phone:   Telephone Information:   Mobile [phone]       Will anyone else be joining the visit? NO  (If patient encounters technical issues they should call [phone]    How would you like to obtain your AVS? MyChart    Are changes needed to the allergy or medication list? No    Are refills needed on medications prescribed by this physician? NO    Reason for visit: RECHECK    Art MORA

## 2024-10-17 ENCOUNTER — TELEPHONE (OUTPATIENT)
Dept: GASTROENTEROLOGY | Facility: CLINIC | Age: 34
End: 2024-10-17
Payer: COMMERCIAL

## 2024-10-17 NOTE — TELEPHONE ENCOUNTER
LVM & sent mychart - pt needs to reschedule appt on 12.31.24 with Dr. Parks to be with different provider - first attempt on 10.17.24 AN

## 2024-10-23 ENCOUNTER — TELEPHONE (OUTPATIENT)
Dept: GASTROENTEROLOGY | Facility: CLINIC | Age: 34
End: 2024-10-23
Payer: COMMERCIAL

## 2024-10-23 NOTE — TELEPHONE ENCOUNTER
LVM for pt to reschedule appt on 12.31.24 with Dr. Parks to be with different provider - second attempt LVM and cancelled appt on 10.23.24

## 2025-01-19 ENCOUNTER — HEALTH MAINTENANCE LETTER (OUTPATIENT)
Age: 35
End: 2025-01-19

## 2025-04-08 ENCOUNTER — OFFICE VISIT (OUTPATIENT)
Dept: GASTROENTEROLOGY | Facility: CLINIC | Age: 35
End: 2025-04-08
Payer: COMMERCIAL

## 2025-04-08 VITALS
DIASTOLIC BLOOD PRESSURE: 81 MMHG | HEIGHT: 66 IN | BODY MASS INDEX: 22.9 KG/M2 | RESPIRATION RATE: 16 BRPM | HEART RATE: 63 BPM | OXYGEN SATURATION: 98 % | SYSTOLIC BLOOD PRESSURE: 116 MMHG | WEIGHT: 142.5 LBS

## 2025-04-08 DIAGNOSIS — K83.9 BILIARY TRACT DISEASE: Primary | ICD-10-CM

## 2025-04-08 PROCEDURE — 3074F SYST BP LT 130 MM HG: CPT | Performed by: INTERNAL MEDICINE

## 2025-04-08 PROCEDURE — 1126F AMNT PAIN NOTED NONE PRSNT: CPT | Performed by: INTERNAL MEDICINE

## 2025-04-08 PROCEDURE — 99215 OFFICE O/P EST HI 40 MIN: CPT | Performed by: INTERNAL MEDICINE

## 2025-04-08 PROCEDURE — G2211 COMPLEX E/M VISIT ADD ON: HCPCS | Performed by: INTERNAL MEDICINE

## 2025-04-08 PROCEDURE — 3079F DIAST BP 80-89 MM HG: CPT | Performed by: INTERNAL MEDICINE

## 2025-04-08 RX ORDER — SERTRALINE HYDROCHLORIDE 100 MG/1
150 TABLET, FILM COATED ORAL DAILY
COMMUNITY
Start: 2025-03-03

## 2025-04-08 ASSESSMENT — PAIN SCALES - GENERAL: PAINLEVEL_OUTOF10: NO PAIN (0)

## 2025-04-08 NOTE — NURSING NOTE
"Chief Complaint   Patient presents with    Follow Up     Liver function       Vitals:    04/08/25 1622   BP: 116/81   Pulse: 63   Resp: 16   SpO2: 98%   Weight: 64.6 kg (142 lb 8 oz)   Height: 1.676 m (5' 6\")       Body mass index is 23 kg/m .     MARYLIN Morgan LPN  "

## 2025-04-08 NOTE — PATIENT INSTRUCTIONS
Summary:    1.  We do not have access to your full medical records, but it appears that you have had a choledochal cyst with extensive stone disease from an early age.  An alternative explanation for extensive stone disease in a young patient would be an inherited disorder of bile metabolism.    2.  As we discussed, I recommend an MRI (MRCP) to evaluate your anatomy and evaluate for any possible retained stones (at the [facility]).  In addition, patients with some forms of choledochal cyst have a risk of bile duct cancer over the long run, and I think it is reasonable for you to get MRI (MRCP) yearly.    3.  Please also get your blood test drawn at a [place] facility.    4.  For now, I think it is reasonable to have yearly labs and MRCP with clinic follow-up.  However, please contact us sooner than that if GI or biliary issues arise.    If you have any questions about your liver disease care or tests, you can call the clinic at [phone].

## 2025-04-08 NOTE — PROGRESS NOTES
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  I am meeting her for the first time.    Please see Dr. Parks's prior hepatology clinic notes.  The following history is taken from one of his notes:  Hx choledochal cyst  - resection with hepaticojejunostomy ([place]) 2008  - laparoscopic cyst resection ([facility]) 2013  - recurrent cholangitis due to hepatolithiasis Feb 2020  - ERCP 2/11/2020  - PTC 2/13/2020  - liver bx 2/28/2020  - IR choledochoplasty 2/28/2020  - IR choledochoplasty 3/6/2020  - IR choledochoplasty 4/24/2020  - ERCP with [facility] 5/7/2020  - ERCP with [facility] 5/27/2020    The patient reports no recent GI or biliary symptoms.  Overall, she feels well.  She has 2 young children.    She reports no family history of biliary or cystic abnormalities.    She lives in [place] with her  and children.  She works in [employer].    Past Medical History:  Past Medical History:   Diagnosis Date    Ascending cholangitis (H) 02/10/2020    Choledochal cyst 2008    Choledochal cyst 2013    Portal vein thrombosis 02/15/2020       There is no problem list on file for this patient.      Surgical History:  Past Surgical History:   Procedure Laterality Date    CHOLEDOCHAL CYST EXCISION  2008    [place]    CHOLEDOCHAL CYST EXCISION  2013    [facility]    ENDOSCOPIC RETROGRADE CHOLANGIOPANCREATOGRAPHY  05/27/2020    [facility]    ENDOSCOPIC RETROGRADE CHOLANGIOPANCREATOGRAPHY  05/07/2020    [facility]    HEPATICOJEJUNOSTOMY  2008    PERCUTANEOUS LIVER BIOPSY  02/28/2020    PERCUTANEOUS TRANSHEPATIC CHOLANGIOGRAHPY AND BILLIARY DRAINAGE  02/13/2020       Social History:  Social History     Tobacco Use    Smoking status: Never    Smokeless tobacco: Never   Vaping Use    Vaping  "status: Never Used   Substance Use Topics    Alcohol use: Not Currently    Drug use: Not Currently       Family History:  Family History   Problem Relation Age of Onset    Hepatitis Father         B    Gestational Diabetes Sister     Diabetes Maternal Grandmother     Cerebrovascular Disease Maternal Grandfather     Colon Cancer No family hx of        Medications:  Current Outpatient Medications   Medication Sig Dispense Refill    Prenatal Vit-Fe Fumarate-FA (PRENATAL MULTIVITAMIN W/IRON) 27-0.8 MG tablet Take 1 tablet by mouth daily      sertraline (ZOLOFT) 100 MG tablet Take 150 mg by mouth daily.      ursodiol (ACTIGALL) 250 MG tablet Take 1 tablet (250 mg) by mouth 2 times daily      enoxaparin ANTICOAGULANT (LOVENOX) 40 MG/0.4ML syringe Inject 40 mg Subcutaneous daily       No current facility-administered medications for this visit.         Objective:         Vitals:    04/08/25 1622   BP: 116/81   Pulse: 63   Resp: 16   SpO2: 98%   Weight: 64.6 kg (142 lb 8 oz)   Height: 1.676 m (5' 6\")     Body mass index is 23 kg/m .     Physical Exam  Constitutional: Well-developed, well-nourished, in no apparent distress.    HEENT: No scleral icterus.   Musculoskeletal:  ROM intact, good muscle bulk    Psychiatric: Normal mood and affect. Behavior is normal.      Labs and Diagnostic tests:  Lab Results   Component Value Date     01/29/2024    POTASSIUM 4.4 01/29/2024    POTASSIUM 4.1 04/18/2022    CHLORIDE 102 01/29/2024    CHLORIDE 107 04/18/2022    CO2 24 01/29/2024    CO2 21 04/18/2022    BUN 10.8 01/29/2024    BUN 11 04/18/2022    CR 0.57 01/29/2024     Lab Results   Component Value Date    BILITOTAL 0.2 06/14/2024    ALT 9 06/14/2024    AST 14 06/14/2024    ALKPHOS 99 06/14/2024     Lab Results   Component Value Date    ALBUMIN 3.9 06/14/2024    ALBUMIN 3.2 04/18/2022    PROTTOTAL 7.4 06/14/2024      Lab Results   Component Value Date    WBC 9.5 01/29/2024    HGB 11.5 01/29/2024    MCV 88 01/29/2024     " 01/29/2024     Lab Results   Component Value Date    INR 1.00 01/29/2024             Assessment and Plan:    This is an otherwise healthy patient with a history of complex stone disease since her childhood.  She has had health care in other cities, but apparently the presumptive diagnosis is a choledochal cyst.  I do not have information about the cyst type, but she apparently has had surgery to remove this.    Other potential causes of complex stone disease in a young patient include rare inherited disorders of bile metabolism.  She is [place], and thus unlikely to have an infectious cause.    She has been asymptomatic for a number of years.  I think it is reasonable to obtain an MRI (MRCP) to better define her anatomy, look for retained stones, and assess for the risk of malignancy.  We will also send a CA 19-9 level and liver tests.    Only have the results of her MRCP, I may review this with our pancreaticobiliary endoscopy team to see if they have recommendations regarding cancer surveillance.    Otherwise, if these test do not show any concerning abnormalities, we will recommend return to this clinic in 1 year.    Her questions were answered.    53 minutes spent with patient, reviewing results, and coordinating care.  The longitudinal plan of care for the diagnosis(es)/condition(s) as documented were addressed during this visit. Due to the added complexity in care, I will continue to support Reyna in the subsequent management and with ongoing continuity of care.     This note was created with voice recognition software, and while reviewed for accuracy, typos may remain.        Moshe Chacon MD  Professor of Medicine  [facility]  Division of Gastroenterology, Hepatology, and Nutrition

## 2025-04-08 NOTE — LETTER
4/8/2025      Reyna Liriano  [address] [facility] [address]      Dear Colleague,    Thank you for referring your patient, Reyna Liriano, to the [facility] [place]. Please see a copy of my visit note below.    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  I am meeting her for the first time.    Please see Dr. Parks's prior hepatology clinic notes.  The following history is taken from one of his notes:  Hx choledochal cyst  - resection with hepaticojejunostomy ([place]) 2008  - laparoscopic cyst resection ([facility]) 2013  - recurrent cholangitis due to hepatolithiasis Feb 2020  - ERCP 2/11/2020  - PTC 2/13/2020  - liver bx 2/28/2020  - IR choledochoplasty 2/28/2020  - IR choledochoplasty 3/6/2020  - IR choledochoplasty 4/24/2020  - ERCP with [facility] 5/7/2020  - ERCP with [facility] 5/27/2020    The patient reports no recent GI or biliary symptoms.  Overall, she feels well.  She has 2 young children.    She reports no family history of biliary or cystic abnormalities.    She lives in [place] with her  and children.  She works in [employer].    Past Medical History:  Past Medical History:   Diagnosis Date     Ascending cholangitis (H) 02/10/2020     Choledochal cyst 2008     Choledochal cyst 2013     Portal vein thrombosis 02/15/2020       There is no problem list on file for this patient.      Surgical History:  Past Surgical History:   Procedure Laterality Date     CHOLEDOCHAL CYST EXCISION  2008    [place]     CHOLEDOCHAL CYST EXCISION  2013    [facility]     ENDOSCOPIC RETROGRADE CHOLANGIOPANCREATOGRAPHY  05/27/2020    [facility]     ENDOSCOPIC RETROGRADE CHOLANGIOPANCREATOGRAPHY  05/07/2020    [facility]     HEPATICOJEJUNOSTOMY   "2008     PERCUTANEOUS LIVER BIOPSY  02/28/2020     PERCUTANEOUS TRANSHEPATIC CHOLANGIOGRAHPY AND BILLIARY DRAINAGE  02/13/2020       Social History:  Social History     Tobacco Use     Smoking status: Never     Smokeless tobacco: Never   Vaping Use     Vaping status: Never Used   Substance Use Topics     Alcohol use: Not Currently     Drug use: Not Currently       Family History:  Family History   Problem Relation Age of Onset     Hepatitis Father         B     Gestational Diabetes Sister      Diabetes Maternal Grandmother      Cerebrovascular Disease Maternal Grandfather      Colon Cancer No family hx of        Medications:  Current Outpatient Medications   Medication Sig Dispense Refill     Prenatal Vit-Fe Fumarate-FA (PRENATAL MULTIVITAMIN W/IRON) 27-0.8 MG tablet Take 1 tablet by mouth daily       sertraline (ZOLOFT) 100 MG tablet Take 150 mg by mouth daily.       ursodiol (ACTIGALL) 250 MG tablet Take 1 tablet (250 mg) by mouth 2 times daily       enoxaparin ANTICOAGULANT (LOVENOX) 40 MG/0.4ML syringe Inject 40 mg Subcutaneous daily       No current facility-administered medications for this visit.         Objective:         Vitals:    04/08/25 1622   BP: 116/81   Pulse: 63   Resp: 16   SpO2: 98%   Weight: 64.6 kg (142 lb 8 oz)   Height: 1.676 m (5' 6\")     Body mass index is 23 kg/m .     Physical Exam  Constitutional: Well-developed, well-nourished, in no apparent distress.    HEENT: No scleral icterus.   Musculoskeletal:  ROM intact, good muscle bulk    Psychiatric: Normal mood and affect. Behavior is normal.      Labs and Diagnostic tests:  Lab Results   Component Value Date     01/29/2024    POTASSIUM 4.4 01/29/2024    POTASSIUM 4.1 04/18/2022    CHLORIDE 102 01/29/2024    CHLORIDE 107 04/18/2022    CO2 24 01/29/2024    CO2 21 04/18/2022    BUN 10.8 01/29/2024    BUN 11 04/18/2022    CR 0.57 01/29/2024     Lab Results   Component Value Date    BILITOTAL 0.2 06/14/2024    ALT 9 06/14/2024    AST 14 " 06/14/2024    ALKPHOS 99 06/14/2024     Lab Results   Component Value Date    ALBUMIN 3.9 06/14/2024    ALBUMIN 3.2 04/18/2022    PROTTOTAL 7.4 06/14/2024      Lab Results   Component Value Date    WBC 9.5 01/29/2024    HGB 11.5 01/29/2024    MCV 88 01/29/2024     01/29/2024     Lab Results   Component Value Date    INR 1.00 01/29/2024             Assessment and Plan:    This is an otherwise healthy patient with a history of complex stone disease since her childhood.  She has had health care in other cities, but apparently the presumptive diagnosis is a choledochal cyst.  I do not have information about the cyst type, but she apparently has had surgery to remove this.    Other potential causes of complex stone disease in a young patient include rare inherited disorders of bile metabolism.  She is [place], and thus unlikely to have an infectious cause.    She has been asymptomatic for a number of years.  I think it is reasonable to obtain an MRI (MRCP) to better define her anatomy, look for retained stones, and assess for the risk of malignancy.  We will also send a CA 19-9 level and liver tests.    Only have the results of her MRCP, I may review this with our pancreaticobiliary endoscopy team to see if they have recommendations regarding cancer surveillance.    Otherwise, if these test do not show any concerning abnormalities, we will recommend return to this clinic in 1 year.    Her questions were answered.    53 minutes spent with patient, reviewing results, and coordinating care.  The longitudinal plan of care for the diagnosis(es)/condition(s) as documented were addressed during this visit. Due to the added complexity in care, I will continue to support Reyna in the subsequent management and with ongoing continuity of care.     This note was created with voice recognition software, and while reviewed for accuracy, typos may remain.        Moshe Chacon MD  Professor of Medicine  [employer]   [employer]  Division of Gastroenterology, Hepatology, and Nutrition       Again, thank you for allowing me to participate in the care of your patient.        Sincerely,        Moshe Chacon MD    Electronically signed